# Patient Record
Sex: FEMALE | Race: WHITE | NOT HISPANIC OR LATINO | Employment: FULL TIME | URBAN - METROPOLITAN AREA
[De-identification: names, ages, dates, MRNs, and addresses within clinical notes are randomized per-mention and may not be internally consistent; named-entity substitution may affect disease eponyms.]

---

## 2017-01-17 RX ORDER — NORETHINDRONE ACETATE AND ETHINYL ESTRADIOL 1; .02 MG/1; MG/1
1 TABLET ORAL DAILY
Qty: 63 TABLET | Refills: 2 | Status: SHIPPED | OUTPATIENT
Start: 2017-01-17 | End: 2017-12-15 | Stop reason: ALTCHOICE

## 2017-09-12 NOTE — TELEPHONE ENCOUNTER
Pharmacy sent refill request for Loestrin 1/20. Pt due for annual exam. Rx denied. Pharmacy notified. No refills until appt made.

## 2017-11-07 ENCOUNTER — HOSPITAL ENCOUNTER (EMERGENCY)
Facility: HOSPITAL | Age: 52
Discharge: HOME | End: 2017-11-07
Attending: EMERGENCY MEDICINE | Admitting: EMERGENCY MEDICINE

## 2017-11-07 ENCOUNTER — APPOINTMENT (OUTPATIENT)
Dept: GENERAL RADIOLOGY | Facility: HOSPITAL | Age: 52
End: 2017-11-07

## 2017-11-07 ENCOUNTER — APPOINTMENT (OUTPATIENT)
Dept: ULTRASOUND IMAGING | Facility: HOSPITAL | Age: 52
End: 2017-11-07

## 2017-11-07 VITALS
BODY MASS INDEX: 33.96 KG/M2 | OXYGEN SATURATION: 96 % | TEMPERATURE: 98.4 F | HEART RATE: 80 BPM | DIASTOLIC BLOOD PRESSURE: 94 MMHG | RESPIRATION RATE: 12 BRPM | WEIGHT: 173 LBS | HEIGHT: 60 IN | SYSTOLIC BLOOD PRESSURE: 144 MMHG

## 2017-11-07 DIAGNOSIS — R07.89 CHEST WALL PAIN: ICD-10-CM

## 2017-11-07 DIAGNOSIS — K21.9 GASTROESOPHAGEAL REFLUX DISEASE WITHOUT ESOPHAGITIS: ICD-10-CM

## 2017-11-07 DIAGNOSIS — R07.89 ATYPICAL CHEST PAIN: Primary | ICD-10-CM

## 2017-11-07 LAB
ALBUMIN SERPL-MCNC: 4.6 G/DL (ref 3.2–4.8)
ALBUMIN/GLOB SERPL: 1.8 G/DL (ref 1.5–2.5)
ALP SERPL-CCNC: 70 U/L (ref 25–100)
ALT SERPL W P-5'-P-CCNC: 49 U/L (ref 7–40)
ANION GAP SERPL CALCULATED.3IONS-SCNC: 4 MMOL/L (ref 3–11)
AST SERPL-CCNC: 39 U/L (ref 0–33)
BASOPHILS # BLD AUTO: 0.04 10*3/MM3 (ref 0–0.2)
BASOPHILS NFR BLD AUTO: 0.7 % (ref 0–1)
BILIRUB SERPL-MCNC: 0.5 MG/DL (ref 0.3–1.2)
BNP SERPL-MCNC: 12 PG/ML (ref 0–100)
BUN BLD-MCNC: 16 MG/DL (ref 9–23)
BUN/CREAT SERPL: 22.9 (ref 7–25)
CALCIUM SPEC-SCNC: 9.1 MG/DL (ref 8.7–10.4)
CHLORIDE SERPL-SCNC: 109 MMOL/L (ref 99–109)
CO2 SERPL-SCNC: 25 MMOL/L (ref 20–31)
CREAT BLD-MCNC: 0.7 MG/DL (ref 0.6–1.3)
D DIMER PPP FEU-MCNC: 0.35 MG/L (FEU) (ref 0–0.5)
DEPRECATED RDW RBC AUTO: 43.2 FL (ref 37–54)
EOSINOPHIL # BLD AUTO: 0.1 10*3/MM3 (ref 0–0.3)
EOSINOPHIL NFR BLD AUTO: 1.7 % (ref 0–3)
ERYTHROCYTE [DISTWIDTH] IN BLOOD BY AUTOMATED COUNT: 13 % (ref 11.3–14.5)
GFR SERPL CREATININE-BSD FRML MDRD: 88 ML/MIN/1.73
GLOBULIN UR ELPH-MCNC: 2.5 GM/DL
GLUCOSE BLD-MCNC: 88 MG/DL (ref 70–100)
HCT VFR BLD AUTO: 44.9 % (ref 34.5–44)
HGB BLD-MCNC: 15.1 G/DL (ref 11.5–15.5)
HOLD SPECIMEN: NORMAL
HOLD SPECIMEN: NORMAL
IMM GRANULOCYTES # BLD: 0.02 10*3/MM3 (ref 0–0.03)
IMM GRANULOCYTES NFR BLD: 0.3 % (ref 0–0.6)
LIPASE SERPL-CCNC: 42 U/L (ref 6–51)
LYMPHOCYTES # BLD AUTO: 1.99 10*3/MM3 (ref 0.6–4.8)
LYMPHOCYTES NFR BLD AUTO: 34.3 % (ref 24–44)
MCH RBC QN AUTO: 30.3 PG (ref 27–31)
MCHC RBC AUTO-ENTMCNC: 33.6 G/DL (ref 32–36)
MCV RBC AUTO: 90 FL (ref 80–99)
MONOCYTES # BLD AUTO: 0.58 10*3/MM3 (ref 0–1)
MONOCYTES NFR BLD AUTO: 10 % (ref 0–12)
NEUTROPHILS # BLD AUTO: 3.07 10*3/MM3 (ref 1.5–8.3)
NEUTROPHILS NFR BLD AUTO: 53 % (ref 41–71)
PLATELET # BLD AUTO: 276 10*3/MM3 (ref 150–450)
PMV BLD AUTO: 9.7 FL (ref 6–12)
POTASSIUM BLD-SCNC: 5 MMOL/L (ref 3.5–5.5)
PROT SERPL-MCNC: 7.1 G/DL (ref 5.7–8.2)
RBC # BLD AUTO: 4.99 10*6/MM3 (ref 3.89–5.14)
SODIUM BLD-SCNC: 138 MMOL/L (ref 132–146)
TROPONIN I SERPL-MCNC: 0 NG/ML (ref 0–0.07)
TROPONIN I SERPL-MCNC: 0.01 NG/ML (ref 0–0.07)
WBC NRBC COR # BLD: 5.8 10*3/MM3 (ref 3.5–10.8)
WHOLE BLOOD HOLD SPECIMEN: NORMAL
WHOLE BLOOD HOLD SPECIMEN: NORMAL

## 2017-11-07 PROCEDURE — 93005 ELECTROCARDIOGRAM TRACING: CPT | Performed by: EMERGENCY MEDICINE

## 2017-11-07 PROCEDURE — 83880 ASSAY OF NATRIURETIC PEPTIDE: CPT | Performed by: EMERGENCY MEDICINE

## 2017-11-07 PROCEDURE — 96375 TX/PRO/DX INJ NEW DRUG ADDON: CPT

## 2017-11-07 PROCEDURE — 80053 COMPREHEN METABOLIC PANEL: CPT | Performed by: EMERGENCY MEDICINE

## 2017-11-07 PROCEDURE — 83690 ASSAY OF LIPASE: CPT | Performed by: EMERGENCY MEDICINE

## 2017-11-07 PROCEDURE — 93005 ELECTROCARDIOGRAM TRACING: CPT

## 2017-11-07 PROCEDURE — 85025 COMPLETE CBC W/AUTO DIFF WBC: CPT | Performed by: EMERGENCY MEDICINE

## 2017-11-07 PROCEDURE — 71010 HC CHEST PA OR AP: CPT

## 2017-11-07 PROCEDURE — 25010000002 KETOROLAC TROMETHAMINE PER 15 MG: Performed by: EMERGENCY MEDICINE

## 2017-11-07 PROCEDURE — 99285 EMERGENCY DEPT VISIT HI MDM: CPT

## 2017-11-07 PROCEDURE — 85379 FIBRIN DEGRADATION QUANT: CPT | Performed by: EMERGENCY MEDICINE

## 2017-11-07 PROCEDURE — 76705 ECHO EXAM OF ABDOMEN: CPT

## 2017-11-07 PROCEDURE — 84484 ASSAY OF TROPONIN QUANT: CPT

## 2017-11-07 PROCEDURE — 96374 THER/PROPH/DIAG INJ IV PUSH: CPT

## 2017-11-07 RX ORDER — CYCLOBENZAPRINE HCL 10 MG
10 TABLET ORAL ONCE
Status: COMPLETED | OUTPATIENT
Start: 2017-11-07 | End: 2017-11-07

## 2017-11-07 RX ORDER — NITROGLYCERIN 0.4 MG/1
0.4 TABLET SUBLINGUAL
Qty: 100 TABLET | Refills: 0 | Status: SHIPPED | OUTPATIENT
Start: 2017-11-07 | End: 2021-05-17 | Stop reason: SDUPTHER

## 2017-11-07 RX ORDER — ALUMINA, MAGNESIA, AND SIMETHICONE 2400; 2400; 240 MG/30ML; MG/30ML; MG/30ML
15 SUSPENSION ORAL ONCE
Status: COMPLETED | OUTPATIENT
Start: 2017-11-07 | End: 2017-11-07

## 2017-11-07 RX ORDER — SODIUM CHLORIDE 0.9 % (FLUSH) 0.9 %
10 SYRINGE (ML) INJECTION AS NEEDED
Status: DISCONTINUED | OUTPATIENT
Start: 2017-11-07 | End: 2017-11-07 | Stop reason: HOSPADM

## 2017-11-07 RX ORDER — PANTOPRAZOLE SODIUM 40 MG/1
40 TABLET, DELAYED RELEASE ORAL DAILY
Qty: 30 TABLET | Refills: 0 | Status: SHIPPED | OUTPATIENT
Start: 2017-11-07 | End: 2017-12-15

## 2017-11-07 RX ORDER — FAMOTIDINE 10 MG/ML
20 INJECTION, SOLUTION INTRAVENOUS ONCE
Status: COMPLETED | OUTPATIENT
Start: 2017-11-07 | End: 2017-11-07

## 2017-11-07 RX ORDER — ASPIRIN 81 MG/1
324 TABLET, CHEWABLE ORAL ONCE
Status: DISCONTINUED | OUTPATIENT
Start: 2017-11-07 | End: 2017-11-07

## 2017-11-07 RX ORDER — CYCLOBENZAPRINE HCL 10 MG
10 TABLET ORAL 3 TIMES DAILY PRN
Qty: 20 TABLET | Refills: 0 | Status: SHIPPED | OUTPATIENT
Start: 2017-11-07 | End: 2017-12-15

## 2017-11-07 RX ORDER — KETOROLAC TROMETHAMINE 15 MG/ML
10 INJECTION, SOLUTION INTRAMUSCULAR; INTRAVENOUS ONCE
Status: COMPLETED | OUTPATIENT
Start: 2017-11-07 | End: 2017-11-07

## 2017-11-07 RX ORDER — PANTOPRAZOLE SODIUM 40 MG/1
40 TABLET, DELAYED RELEASE ORAL ONCE
Status: COMPLETED | OUTPATIENT
Start: 2017-11-07 | End: 2017-11-07

## 2017-11-07 RX ADMIN — KETOROLAC TROMETHAMINE 10 MG: 15 INJECTION, SOLUTION INTRAMUSCULAR; INTRAVENOUS at 10:27

## 2017-11-07 RX ADMIN — FAMOTIDINE 20 MG: 10 INJECTION, SOLUTION INTRAVENOUS at 08:55

## 2017-11-07 RX ADMIN — ALUMINUM HYDROXIDE, MAGNESIUM HYDROXIDE, AND DIMETHICONE 15 ML: 400; 400; 40 SUSPENSION ORAL at 10:28

## 2017-11-07 RX ADMIN — CYCLOBENZAPRINE HYDROCHLORIDE 10 MG: 10 TABLET, FILM COATED ORAL at 10:27

## 2017-11-07 RX ADMIN — NITROGLYCERIN 0.5 INCH: 20 OINTMENT TOPICAL at 08:51

## 2017-11-07 RX ADMIN — LIDOCAINE HYDROCHLORIDE 15 ML: 20 SOLUTION ORAL; TOPICAL at 10:28

## 2017-11-07 RX ADMIN — PANTOPRAZOLE SODIUM 40 MG: 40 TABLET, DELAYED RELEASE ORAL at 13:01

## 2017-11-07 NOTE — ED PROVIDER NOTES
Subjective   HPI Comments: Nicki Dillard is a 52 y.o.female who works for [employer] as an . She presents to the emergency department with c/o substernal chest pain radiating to the neck and back that began at 0300 this morning. Her pain progressively worsened throughout the morning but has improved here upon arrival after receiving  and 0.4 mg of nitro en route from EMS. Her pain is described as a stabbing sensation that is worse with deep breathing. During the worst of her discomfort, she felt short of breath and pre-syncopal. She also reports a burning sensation similar to heartburn but denies nausea or diaphoresis. She has experienced episodes of presyncope before, however, she had been without recurrence since August.     Her blood pressure is noted to be elevated today (148/96), but normal in the past. Her bowel movements have been normal. She has no cough, rhinorrhea, fever, or chills but notes a sore throat that improved over the weekend.    The patient had a heart catheterization in 2014 after a similar episode of chest pain triggered by stress. The patient states that she was getting her masters at the time and thought she may have failed her examination. She does note that she is under recent, significant stressors at work and believes that this may be attributing to her current symptoms.     She consumes alcohol occasionally but denies tobacco or illicit drug use. She has no history of DVT or PE. Her family history is positive for HTN. She has no history of cardiac disease that she is aware of.    There are no other acute complaints at this time.     Patient is a 52 y.o. female presenting with chest pain.   History provided by:  Patient  Chest Pain   Pain location:  Substernal area  Pain quality: radiating and stabbing    Pain radiates to:  Neck and upper back  Pain severity:  Moderate  Onset quality:  Gradual  Duration:  4 hours  Timing:  Constant  Progression:  Waxing and  waning  Chronicity:  New  Relieved by:  Nothing  Worsened by:  Nothing  Ineffective treatments:  None tried  Associated symptoms: shortness of breath    Associated symptoms: no abdominal pain, no cough, no dizziness, no dysphagia, no fever, no headache, no nausea, no vomiting and no weakness    Shortness of breath:     Duration:  4 hours    Progression:  Waxing and waning      Review of Systems   Constitutional: Negative for chills and fever.   HENT: Negative for congestion, rhinorrhea, sore throat and trouble swallowing.    Respiratory: Positive for shortness of breath. Negative for cough.    Cardiovascular: Positive for chest pain.   Gastrointestinal: Negative for abdominal pain, diarrhea, nausea and vomiting.   Musculoskeletal: Negative for neck pain.   Neurological: Negative for dizziness, weakness and headaches.        Pre-syncope   All other systems reviewed and are negative.      Past Medical History:   Diagnosis Date   • Menorrhagia        No Known Allergies    Past Surgical History:   Procedure Laterality Date   • ADENOIDECTOMY     •  SECTION     • HAND SURGERY Right    • HERNIA REPAIR     • TONSILLECTOMY     • TUBAL ABDOMINAL LIGATION         History reviewed. No pertinent family history.    Social History     Social History   • Marital status:      Spouse name: N/A   • Number of children: N/A   • Years of education: N/A     Social History Main Topics   • Smoking status: Never Smoker   • Smokeless tobacco: Never Used   • Alcohol use Yes      Comment: occ   • Drug use: No   • Sexual activity: Yes     Partners: Male     Birth control/ protection: OCP     Other Topics Concern   • None     Social History Narrative   • None         Objective   Physical Exam   Constitutional: She is oriented to person, place, and time. She appears well-developed and well-nourished. No distress.   HENT:   Head: Normocephalic and atraumatic.   Nose: Nose normal.   Mouth/Throat: Oropharynx is clear and moist.   Eyes:  Conjunctivae are normal. No scleral icterus.   Neck: Normal range of motion. Neck supple.   Cardiovascular: Normal rate, regular rhythm and normal heart sounds.    No murmur heard.  Pulmonary/Chest: Effort normal and breath sounds normal. No respiratory distress. She has no wheezes. She has no rales.   Abdominal: Soft. Bowel sounds are normal. She exhibits no mass. There is no tenderness. There is no rebound, no guarding and negative Amaya's sign.   Musculoskeletal: Normal range of motion. She exhibits no edema.   No stigmata of DVT.   Neurological: She is alert and oriented to person, place, and time.   Skin: Skin is warm and dry. No erythema.   Psychiatric: She has a normal mood and affect. Her behavior is normal.   Nursing note and vitals reviewed.      Procedures         ED Course  ED Course   Comment By Time   Modified Wells' Criteria -2 Luisito Osorio MD 11/07 0711   Dr. Osorio is at the bedside reevaluating the patient. The patient is resting comfortably. Discussed findings and plan for gallbladder US. All are agreeable. Will reassess after US results. Darshana Arguello 11/07 0912   Patient reports improvement of symptoms.  She again has easily reproducible left anterior chest wall tenderness to palpation or pectoralis muscle that exactly reproduces her presenting symptoms.  She did have this significant GERD symptoms as well.  Her ALT and AST are mildly elevated.  I've ordered a gallbladder ultrasound additionally, but her alkaline phosphatase is normal and she has a negative Amaya sign initially and on repeat evaluation now.  I discussed her normal troponin, and continued evaluation.  Patient and spouse agree. Luisito Osorio MD 11/07 0914   Patient had residual chest pain that was improving prior to her GI cocktail.  She had immediate and complete relief after GI cocktail promptly.  She had the same time had reproducible anterior chest wall tenderness and signs of muscle strain.I discussed the findings  "with the patient.  Second EKG is unremarkable.  She does have borderline cardiomegaly on chest x-ray, and reports that she wakes herself up snoring\".  I've asked her to pursue a home sleep study with Dr. Mendez your primary care physician.I'll plan on treatment for GERD but also chest wall strain.  I discussed stress management with the patient as well.  She reports that she is planning on losing weight and starting an exercise regimen but will have her hold off on that until after her follow-up evaluation although this is very atypical chest pain and she had a heart catheter in 2014 with normal coronary arteries.Very pleasant reliable patient and spouse verbalized agreement with the current plan of care. Luisito Osorio MD 11/07 1052   Heart Score is 1 for age.    Low Score (0-3 points)    Risk of MACE of 0.9-1.7%. Darshana Arguello 11/07 1107   I discussed discharge instructions at length with the patient on last of serial evaluations now immediately prior to discharge.  She feels markedly improved.  She had immediately relief from her GI cocktail.  She has had no recurrence of symptoms.  She did have Toradol and Flexeril as well.  I discussed sleep study in view of snoring and weight loss that she's had along with her hypertension.  She'll discuss this with Dr. Mendez.  I discussed GERD treatment.  She had artery planned on weight loss.  She has seen Dr. Castrejon in the office and have put in a consult for them and discuss chest pain clinic follow-up this week.  We'll keep her off work today and tomorrow.I discussed indications for immediate return.Very pleasant and responsible patient and spouse verbalized understanding agreement with the plan of care. Luisito Osorio MD 11/07 1221     Recent Results (from the past 24 hour(s))   Comprehensive Metabolic Panel    Collection Time: 11/07/17  6:55 AM   Result Value Ref Range    Glucose 88 70 - 100 mg/dL    BUN 16 9 - 23 mg/dL    Creatinine 0.70 0.60 - 1.30 mg/dL    " Sodium 138 132 - 146 mmol/L    Potassium 5.0 3.5 - 5.5 mmol/L    Chloride 109 99 - 109 mmol/L    CO2 25.0 20.0 - 31.0 mmol/L    Calcium 9.1 8.7 - 10.4 mg/dL    Total Protein 7.1 5.7 - 8.2 g/dL    Albumin 4.60 3.20 - 4.80 g/dL    ALT (SGPT) 49 (H) 7 - 40 U/L    AST (SGOT) 39 (H) 0 - 33 U/L    Alkaline Phosphatase 70 25 - 100 U/L    Total Bilirubin 0.5 0.3 - 1.2 mg/dL    eGFR Non African Amer 88 >60 mL/min/1.73    Globulin 2.5 gm/dL    A/G Ratio 1.8 1.5 - 2.5 g/dL    BUN/Creatinine Ratio 22.9 7.0 - 25.0    Anion Gap 4.0 3.0 - 11.0 mmol/L   Lipase    Collection Time: 11/07/17  6:55 AM   Result Value Ref Range    Lipase 42 6 - 51 U/L   BNP    Collection Time: 11/07/17  6:55 AM   Result Value Ref Range    BNP 12.0 0.0 - 100.0 pg/mL   Light Blue Top    Collection Time: 11/07/17  6:55 AM   Result Value Ref Range    Extra Tube hold for add-on    Green Top (Gel)    Collection Time: 11/07/17  6:55 AM   Result Value Ref Range    Extra Tube Hold for add-ons.    Lavender Top    Collection Time: 11/07/17  6:55 AM   Result Value Ref Range    Extra Tube hold for add-on    Gold Top - SST    Collection Time: 11/07/17  6:55 AM   Result Value Ref Range    Extra Tube Hold for add-ons.    CBC Auto Differential    Collection Time: 11/07/17  6:55 AM   Result Value Ref Range    WBC 5.80 3.50 - 10.80 10*3/mm3    RBC 4.99 3.89 - 5.14 10*6/mm3    Hemoglobin 15.1 11.5 - 15.5 g/dL    Hematocrit 44.9 (H) 34.5 - 44.0 %    MCV 90.0 80.0 - 99.0 fL    MCH 30.3 27.0 - 31.0 pg    MCHC 33.6 32.0 - 36.0 g/dL    RDW 13.0 11.3 - 14.5 %    RDW-SD 43.2 37.0 - 54.0 fl    MPV 9.7 6.0 - 12.0 fL    Platelets 276 150 - 450 10*3/mm3    Neutrophil % 53.0 41.0 - 71.0 %    Lymphocyte % 34.3 24.0 - 44.0 %    Monocyte % 10.0 0.0 - 12.0 %    Eosinophil % 1.7 0.0 - 3.0 %    Basophil % 0.7 0.0 - 1.0 %    Immature Grans % 0.3 0.0 - 0.6 %    Neutrophils, Absolute 3.07 1.50 - 8.30 10*3/mm3    Lymphocytes, Absolute 1.99 0.60 - 4.80 10*3/mm3    Monocytes, Absolute 0.58 0.00 -  1.00 10*3/mm3    Eosinophils, Absolute 0.10 0.00 - 0.30 10*3/mm3    Basophils, Absolute 0.04 0.00 - 0.20 10*3/mm3    Immature Grans, Absolute 0.02 0.00 - 0.03 10*3/mm3   D-dimer, Quantitative    Collection Time: 11/07/17  6:55 AM   Result Value Ref Range    D-Dimer, Quantitative 0.35 0.00 - 0.50 mg/L (FEU)   POC Troponin, Rapid    Collection Time: 11/07/17  6:57 AM   Result Value Ref Range    Troponin I 0.00 0.00 - 0.07 ng/mL   POC Troponin, Rapid    Collection Time: 11/07/17 10:34 AM   Result Value Ref Range    Troponin I 0.01 0.00 - 0.07 ng/mL     Note: In addition to lab results from this visit, the labs listed above may include labs taken at another facility or during a different encounter within the last 24 hours. Please correlate lab times with ED admission and discharge times for further clarification of the services performed during this visit.    XR Chest 1 View   Preliminary Result   Borderline cardiac enlargement without acute cardiopulmonary   parenchymal disease.       D:  11/07/2017   E:  11/07/2017              US Gallbladder    (Results Pending)     Vitals:    11/07/17 1100 11/07/17 1115 11/07/17 1130 11/07/17 1145   BP: 120/97 114/74 132/91 136/88   BP Location:       Patient Position:       Pulse: 89 82 75 72   Resp:       Temp:       SpO2: 92% 96% 96% 94%   Weight:       Height:         Medications   sodium chloride 0.9 % flush 10 mL (not administered)   pantoprazole (PROTONIX) EC tablet 40 mg (not administered)   nitroglycerin (NITROSTAT) ointment 0.5 inch (0.5 inches Topical Given 11/7/17 0851)   famotidine (PEPCID) injection 20 mg (20 mg Intravenous Given 11/7/17 0855)   ketorolac (TORADOL) injection 10 mg (10 mg Intravenous Given 11/7/17 1027)   aluminum-magnesium hydroxide-simethicone (MAALOX MAX) 400-400-40 MG/5ML suspension 15 mL (15 mL Oral Given 11/7/17 1028)   lidocaine viscous (XYLOCAINE) 2 % mouth solution 15 mL (15 mL Mouth/Throat Given 11/7/17 1024)   cyclobenzaprine (FLEXERIL)  tablet 10 mg (10 mg Oral Given 11/7/17 1027)     ECG/EMG Results (last 24 hours)     Procedure Component Value Units Date/Time    ECG 12 Lead [[id number]] Collected:  11/07/17 0649     Updated:  11/07/17 0651                        [facility]    Final diagnoses:   Atypical chest pain   Chest wall pain   Gastroesophageal reflux disease without esophagitis       Documentation assistance provided by liliana Arguello.  Information recorded by the liliana was done at my direction and has been verified and validated by me.     Darshana Arguello  11/07/17 0753       Darshana Arguello  11/07/17 0812       Darshana Arguello  11/07/17 1102       Luisito Osorio MD  11/07/17 1223

## 2017-11-07 NOTE — DISCHARGE INSTRUCTIONS
No vigorous physical activity until you are cleared by your cardiologist.     Follow-up with the cardiology clinic.  They'll call you within the next 24 hours with an appointment this week.  Call them if they do not call by noon tomorrow.    Schedule an appointment with Dr. Mendez this week for follow up to discuss a weight loss program, GERD, chest wall strain, and a sleep study.  Have Dr. Mendez set up an at-home night sleep study for you this coming week.  Talked to Dr. Mendze about stress relief as well    Take the Protonix daily.  He was given been given one dose today.  You may take Pepcid at night for the next 3 days over-the-counter until the Protonix is fully effective.    Take the Flexeril as needed for muscle spasm but I like you to take 2 more doses today as ordered.  No driving or dangerous activity while taking the Flexeril    Immediately return to the emergency department if you develop new or worsening symptoms.  If you have any significant cardiac symptoms take your nitroglycerin return to the emergency department    Continue your other medications as previously prescribed.

## 2017-12-15 ENCOUNTER — OFFICE VISIT (OUTPATIENT)
Dept: OBSTETRICS AND GYNECOLOGY | Facility: CLINIC | Age: 52
End: 2017-12-15

## 2017-12-15 VITALS
BODY MASS INDEX: 29.83 KG/M2 | DIASTOLIC BLOOD PRESSURE: 84 MMHG | WEIGHT: 158 LBS | OXYGEN SATURATION: 98 % | SYSTOLIC BLOOD PRESSURE: 128 MMHG | HEART RATE: 84 BPM | HEIGHT: 61 IN | RESPIRATION RATE: 20 BRPM

## 2017-12-15 DIAGNOSIS — Z01.419 WELL WOMAN EXAM WITH ROUTINE GYNECOLOGICAL EXAM: Primary | ICD-10-CM

## 2017-12-15 DIAGNOSIS — N95.1 PERIMENOPAUSE: ICD-10-CM

## 2017-12-15 PROCEDURE — 99396 PREV VISIT EST AGE 40-64: CPT | Performed by: NURSE PRACTITIONER

## 2017-12-15 NOTE — PROGRESS NOTES
[facility] ANNUAL WELL [facility]OMAN VISIT      Nicki Dillard  [id number]  1965      Chief Complaint: Gynecologic Exam (Annual Exam)        History of present illness:    Nicki Dillard is a 52 y.o. year old  presenting to be seen for her annual exam. She is a previous patient of Dr. Luna, last seen on 2016 for annual exam. Last year she was having irregular heavy menses and started on OCPs. She did well, but then PCP discovered elevated BP and encouraged her to d/c these. LMP was in 2017 and has not recurred. She describes occasional host flashes, but nothing that interferes with her daily life. Manmmogram is UTD and she would like to repeat pap smear today. PCP is managing her colon screening and they are planning cologard, declines referral for colonoscopy today.     SEXUAL Hx:  She is currently sexually active.  In the past year there has not been new sexual partners.    Condoms are not typically used.  She would not like to be screened for STD's at today's exam.  Current birth control method: tubal ligation.  She is happy with her current method of contraception and does not want to discuss alternative methods of contraception.  MENSTRUAL Hx:  Patient's last menstrual period was 2017. No significant menopausal symptoms.   HEALTH Hx:  She exercises regularly: no (and has no plans to become more active).  She wears her seat belt:yes.  She has concerns about domestic violence: no.  She is a non-smoker.      Past Medical History:   Diagnosis Date   • Arthritis    • Hypertension    • Menorrhagia    • Urinary leakage        Past Surgical History:   Procedure Laterality Date   • ADENOIDECTOMY     •  SECTION     • HAND SURGERY Right    • HERNIA REPAIR     • TONSILLECTOMY     • TUBAL ABDOMINAL LIGATION         MEDICATIONS: The current medication list was reviewed and reconciled.     Allergies:  has No Known Allergies.    Family History   Problem Relation Age of Onset   •  "Hypertension Father        Health Maintenance:  Last mammogram was 9/2017. Last pap smear was unknown, results were  normal PAP.. She  does not have a history of abnormal pap smears.    Review of Systems   Constitutional: Negative for fatigue, fever and unexpected weight change.   HENT: Negative for congestion, ear pain, hearing loss, sinus pressure and trouble swallowing.    Eyes: Negative for visual disturbance.   Respiratory: Negative for cough, chest tightness, shortness of breath and wheezing.    Cardiovascular: Negative for chest pain, palpitations and leg swelling.   Gastrointestinal: Negative for abdominal distention, abdominal pain, constipation, diarrhea, nausea and vomiting.   Endocrine: Negative for cold intolerance, heat intolerance, polydipsia, polyphagia and polyuria.   Genitourinary: Negative for difficulty urinating, dyspareunia, dysuria, frequency, hematuria, pelvic pain, urgency, vaginal bleeding, vaginal discharge and vaginal pain.   Musculoskeletal: Negative for arthralgias, gait problem, joint swelling and myalgias.   Skin: Negative for color change, pallor and rash.   Neurological: Negative for dizziness, seizures, syncope, weakness, light-headedness, numbness and headaches.   Hematological: Negative for adenopathy. Does not bruise/bleed easily.   Psychiatric/Behavioral: Negative for agitation, confusion, sleep disturbance and suicidal ideas. The patient is not nervous/anxious.        Physical Exam  Vital Signs: /84  Pulse 84  Resp 20  Ht 155 cm (61.02\")  Wt 71.7 kg (158 lb)  LMP 07/01/2017  SpO2 98%  BMI 29.83 kg/m2   General Appearance:  alert, cooperative, no apparent distress and appears stated age   Neurologic/Psychiatric: A&O x 3, gait steady, appropriate affect   HEENT:  Normocephalic, without obvious abnormality, mucous membranes moist   Neck: Supple, symmetrical, trachea midline, no adenopathy;  No thyromegaly, masses, or tenderness   Back:   Symmetric, no curvature, ROM " normal, no CVA tenderness   Lungs:   Clear to auscultation bilaterally; respirations regular, even, and unlabored bilaterally   Heart:  Regular rate and rhythm, no murmurs appreciated   Breasts:  Symmetrical, no masses, no lesions and no nipple discharge   Abdomen:   Soft, non-tender, non-distended and no organomegaly   Lymph nodes: No cervical, supraclavicular, inguinal or axillary adenopathy noted   Extremities: Normal, atraumatic; no clubbing, cyanosis, or edema    Skin: No rashes, ulcers, or suspicious lesions noted   Pelvic: External Genitalia  without lesions or skin changes  Vagina  is pink, moist, without lesions.   Cervix  normal, without lesions, no discharge, no CMT and pap obtained  Uterus  normal size, midposition, mobile and nontender  Ovaries  without palpable masses or fullness  Parametria  smooth  Rectovaginal  Female rectovaginal: deferred       Procedure Note:  No notes on file    Assessment and Plan:    Nicki was seen today for gynecologic exam.    Diagnoses and all orders for this visit:    Well woman exam with routine gynecological exam    Perimenopause        Pap was done today.  If she does not receive the results of the Pap within 2 weeks  time, she was instructed to call to find out the results.  I explained to Nicki that the recommendations for Pap smear interval in a low risk patient's has lengthened to 3 years time.  I encouraged her to be seen yearly for a full physical exam including breast and pelvic exam even during the off years when PAP's will not be performed.    She was encouraged to get yearly mammograms.  She should report any palpable breast lump(s) or skin changes regardless of mammographic findings.  I explained to Nicki that notification regarding her mammogram results will come from the center performing the study.  Our office will not be routinely calling with mammogram results.  It is her responsibility to make sure that the results from the mammogram are  communicated to her by the breast center.  If she has any questions about the results, she is welcome to call our office anytime.    Colon cancer screening managed by PCP. She was recommended to begin bone density scans (DEXA) at age 60-65 for osteoporosis screening.    We discussed perimenopausal state. Postmenopausal when 12 months without a menstrual bleed. No need for HRT as vasomotor symptoms are currently very tolerable. All questions answered to her satisfaction.       Return in about 1 year (around 12/15/2018) for annual exam or PRN.      Tara Powell, ESTRADA      Note: Speech recognition transcription software was used to dictate portions of this document.  An attempt at proofreading has been made though minor errors in transcription may still be present.  Please do not hesitate to call our office with any questions.

## 2018-05-15 ENCOUNTER — OFFICE VISIT (OUTPATIENT)
Dept: RETAIL CLINIC | Facility: CLINIC | Age: 53
End: 2018-05-15

## 2018-05-15 VITALS
DIASTOLIC BLOOD PRESSURE: 100 MMHG | HEIGHT: 60 IN | SYSTOLIC BLOOD PRESSURE: 160 MMHG | WEIGHT: 163 LBS | TEMPERATURE: 98.9 F | BODY MASS INDEX: 32 KG/M2 | HEART RATE: 96 BPM | OXYGEN SATURATION: 98 %

## 2018-05-15 DIAGNOSIS — R03.0 ELEVATED BLOOD PRESSURE READING: ICD-10-CM

## 2018-05-15 DIAGNOSIS — J01.10 ACUTE NON-RECURRENT FRONTAL SINUSITIS: Primary | ICD-10-CM

## 2018-05-15 DIAGNOSIS — Z12.11 SCREEN FOR COLON CANCER: ICD-10-CM

## 2018-05-15 PROCEDURE — 99213 OFFICE O/P EST LOW 20 MIN: CPT | Performed by: NURSE PRACTITIONER

## 2018-05-15 RX ORDER — AMOXICILLIN AND CLAVULANATE POTASSIUM 875; 125 MG/1; MG/1
1 TABLET, FILM COATED ORAL 2 TIMES DAILY
Qty: 20 TABLET | Refills: 0 | Status: SHIPPED | OUTPATIENT
Start: 2018-05-15 | End: 2018-05-25 | Stop reason: HOSPADM

## 2018-05-15 NOTE — PATIENT INSTRUCTIONS
Sinusitis, Adult  Sinusitis is soreness and inflammation of your sinuses. Sinuses are hollow spaces in the bones around your face. They are located:  · Around your eyes.  · In the middle of your forehead.  · Behind your nose.  · In your cheekbones.  Your sinuses and nasal passages are lined with a stringy fluid (mucus). Mucus normally drains out of your sinuses. When your nasal tissues get inflamed or swollen, the mucus can get trapped or blocked so air cannot flow through your sinuses. This lets bacteria, viruses, and funguses grow, and that leads to infection.  Follow these instructions at home:  Medicines   · Take, use, or apply over-the-counter and prescription medicines only as told by your doctor. These may include nasal sprays.  · If you were prescribed an antibiotic medicine, take it as told by your doctor. Do not stop taking the antibiotic even if you start to feel better.  Hydrate and Humidify   · Drink enough water to keep your pee (urine) clear or pale yellow.  · Use a cool mist humidifier to keep the humidity level in your home above 50%.  · Breathe in steam for 10-15 minutes, 3-4 times a day or as told by your doctor. You can do this in the bathroom while a hot shower is running.  · Try not to spend time in cool or dry air.  Rest   · Rest as much as possible.  · Sleep with your head raised (elevated).  · Make sure to get enough sleep each night.  General instructions   · Put a warm, moist washcloth on your face 3-4 times a day or as told by your doctor. This will help with discomfort.  · Wash your hands often with soap and water. If there is no soap and water, use hand .  · Do not smoke. Avoid being around people who are smoking (secondhand smoke).  · Keep all follow-up visits as told by your doctor. This is important.  Contact a doctor if:  · You have a fever.  · Your symptoms get worse.  · Your symptoms do not get better within 10 days.  Get help right away if:  · You have a very bad  headache.  · You cannot stop throwing up (vomiting).  · You have pain or swelling around your face or eyes.  · You have trouble seeing.  · You feel confused.  · Your neck is stiff.  · You have trouble breathing.  This information is not intended to replace advice given to you by your health care provider. Make sure you discuss any questions you have with your health care provider.  Document Released: 06/05/2009 Document Revised: 08/13/2017 Document Reviewed: 10/12/2016  [employer] Interactive Patient Education © 2017 [employer]    Hypertension  Hypertension is another name for high blood pressure. High blood pressure forces your heart to work harder to pump blood. This can cause problems over time.  There are two numbers in a blood pressure reading. There is a top number (systolic) over a bottom number (diastolic). It is best to have a blood pressure below 120/80. Healthy choices can help lower your blood pressure. You may need medicine to help lower your blood pressure if:  · Your blood pressure cannot be lowered with healthy choices.  · Your blood pressure is higher than 130/80.  Follow these instructions at home:  Eating and drinking   · If directed, follow the DASH eating plan. This diet includes:  ¨ Filling half of your plate at each meal with fruits and vegetables.  ¨ Filling one quarter of your plate at each meal with whole grains. Whole grains include whole wheat pasta, brown rice, and whole grain bread.  ¨ Eating or drinking low-fat dairy products, such as skim milk or low-fat yogurt.  ¨ Filling one quarter of your plate at each meal with low-fat (lean) proteins. Low-fat proteins include fish, skinless chicken, eggs, beans, and tofu.  ¨ Avoiding fatty meat, cured and processed meat, or chicken with skin.  ¨ Avoiding premade or processed food.  · Eat less than 1,500 mg of salt (sodium) a day.  · Limit alcohol use to no more than 1 drink a day for nonpregnant women and 2 drinks a day for men. One drink  equals 12 oz of beer, 5 oz of wine, or 1½ oz of hard liquor.  Lifestyle   · Work with your doctor to stay at a healthy weight or to lose weight. Ask your doctor what the best weight is for you.  · Get at least 30 minutes of exercise that causes your heart to beat faster (aerobic exercise) most days of the week. This may include walking, swimming, or biking.  · Get at least 30 minutes of exercise that strengthens your muscles (resistance exercise) at least 3 days a week. This may include lifting weights or pilates.  · Do not use any products that contain nicotine or tobacco. This includes cigarettes and e-cigarettes. If you need help quitting, ask your doctor.  · Check your blood pressure at home as told by your doctor.  · Keep all follow-up visits as told by your doctor. This is important.  Medicines   · Take over-the-counter and prescription medicines only as told by your doctor. Follow directions carefully.  · Do not skip doses of blood pressure medicine. The medicine does not work as well if you skip doses. Skipping doses also puts you at risk for problems.  · Ask your doctor about side effects or reactions to medicines that you should watch for.  Contact a doctor if:  · You think you are having a reaction to the medicine you are taking.  · You have headaches that keep coming back (recurring).  · You feel dizzy.  · You have swelling in your ankles.  · You have trouble with your vision.  Get help right away if:  · You get a very bad headache.  · You start to feel confused.  · You feel weak or numb.  · You feel faint.  · You get very bad pain in your:  ¨ Chest.  ¨ Belly (abdomen).  · You throw up (vomit) more than once.  · You have trouble breathing.  Summary  · Hypertension is another name for high blood pressure.  · Making healthy choices can help lower blood pressure. If your blood pressure cannot be controlled with healthy choices, you may need to take medicine.  This information is not intended to replace  advice given to you by your health care provider. Make sure you discuss any questions you have with your health care provider.  Document Released: 06/05/2009 Document Revised: 11/15/2017 Document Reviewed: 11/15/2017  [employer]vier Interactive Patient Education © 2017 [employer].

## 2018-05-15 NOTE — PROGRESS NOTES
Subjective   Nicki Dillard is a 53 y.o. female.     Sore Throat    This is a new (headache and bilateral sinus pressure. post nasal draiange, sore throat and yellow expectorant) problem. The current episode started in the past 7 days. The problem has been gradually worsening. Neither side of throat is experiencing more pain than the other. The maximum temperature recorded prior to her arrival was 100.4 - 100.9 F. The fever has been present for 1 to 2 days. The pain is at a severity of 8/10. The pain is moderate. Associated symptoms include headaches. Pertinent negatives include no abdominal pain, congestion, coughing, diarrhea, drooling, ear discharge, ear pain, hoarse voice, plugged ear sensation, neck pain, shortness of breath, stridor, swollen glands, trouble swallowing or vomiting. She has had no exposure to strep or mono. She has tried acetaminophen (vicks cold and sinus medication) for the symptoms. The treatment provided mild relief.        The following portions of the patient's history were reviewed and updated as appropriate: allergies, current medications, past family history, past medical history, past surgical history and problem list.    Review of Systems   Constitutional: Positive for activity change, appetite change, fatigue and fever.   HENT: Positive for postnasal drip, sinus pain, sinus pressure and sore throat. Negative for congestion, drooling, ear discharge, ear pain, hoarse voice and trouble swallowing.    Eyes: Positive for pain.        Pain behind eyes in sinus area bilaterally   Respiratory: Negative for cough, shortness of breath and stridor.    Cardiovascular: Negative.    Gastrointestinal: Negative.  Negative for abdominal pain, diarrhea and vomiting.   Musculoskeletal: Negative.  Negative for neck pain.   Skin: Negative.    Allergic/Immunologic: Positive for environmental allergies.   Neurological: Positive for light-headedness and headaches.   Hematological: Negative.         Objective   Physical Exam   Constitutional: She is oriented to person, place, and time. She appears well-developed and well-nourished. She is cooperative.   HENT:   Head: Normocephalic and atraumatic.   Right Ear: Tympanic membrane and external ear normal.   Left Ear: Tympanic membrane, external ear and ear canal normal.   Nose: Mucosal edema and rhinorrhea present. No sinus tenderness. Right sinus exhibits maxillary sinus tenderness and frontal sinus tenderness. Left sinus exhibits maxillary sinus tenderness and frontal sinus tenderness.   Mouth/Throat: Mucous membranes are normal. Posterior oropharyngeal erythema present. No oropharyngeal exudate or tonsillar abscesses. Tonsils are 0 on the right. Tonsils are 0 on the left.   Eyes: Conjunctivae are normal.   Neck: Normal range of motion. Neck supple.   Cardiovascular: Normal rate, regular rhythm and normal heart sounds.  Exam reveals no friction rub.    No murmur heard.  Pulmonary/Chest: Effort normal and breath sounds normal. No respiratory distress. She has no wheezes. She has no rales.   Neurological: She is alert and oriented to person, place, and time.   Skin: Skin is warm and dry.   Psychiatric: She has a normal mood and affect.   Vitals reviewed.      Assessment/Plan   Nicki was seen today for sore throat and headache.    Diagnoses and all orders for this visit:    Acute non-recurrent frontal sinusitis  -     amoxicillin-clavulanate (AUGMENTIN) 875-125 MG per tablet; Take 1 tablet by mouth 2 (Two) Times a Day.    Elevated blood pressure reading    check b/p daily.   Stop Vicks sinus and cold  Use coricidin HBP for congestion  Increase fluids and rest

## 2018-05-24 ENCOUNTER — APPOINTMENT (OUTPATIENT)
Dept: CARDIOLOGY | Facility: HOSPITAL | Age: 53
End: 2018-05-24
Attending: INTERNAL MEDICINE

## 2018-05-24 ENCOUNTER — HOSPITAL ENCOUNTER (INPATIENT)
Facility: HOSPITAL | Age: 53
LOS: 1 days | Discharge: HOME | End: 2018-05-25
Attending: EMERGENCY MEDICINE | Admitting: INTERNAL MEDICINE

## 2018-05-24 ENCOUNTER — APPOINTMENT (OUTPATIENT)
Dept: GENERAL RADIOLOGY | Facility: HOSPITAL | Age: 53
End: 2018-05-24

## 2018-05-24 DIAGNOSIS — R07.9 CHEST PAIN, UNSPECIFIED TYPE: Primary | ICD-10-CM

## 2018-05-24 DIAGNOSIS — R03.0 ELEVATED BLOOD PRESSURE READING WITHOUT DIAGNOSIS OF HYPERTENSION: ICD-10-CM

## 2018-05-24 DIAGNOSIS — I21.4 NON-STEMI (NON-ST ELEVATED MYOCARDIAL INFARCTION) (HCC): ICD-10-CM

## 2018-05-24 LAB
ALBUMIN SERPL-MCNC: 4.4 G/DL (ref 3.2–4.8)
ALBUMIN/GLOB SERPL: 1.4 G/DL (ref 1.5–2.5)
ALP SERPL-CCNC: 81 U/L (ref 25–100)
ALT SERPL W P-5'-P-CCNC: 92 U/L (ref 7–40)
ANION GAP SERPL CALCULATED.3IONS-SCNC: 10 MMOL/L (ref 3–11)
AST SERPL-CCNC: 68 U/L (ref 0–33)
B-HCG UR QL: NEGATIVE
BASOPHILS # BLD AUTO: 0.02 10*3/MM3 (ref 0–0.2)
BASOPHILS NFR BLD AUTO: 0.3 % (ref 0–1)
BILIRUB SERPL-MCNC: 0.5 MG/DL (ref 0.3–1.2)
BNP SERPL-MCNC: 66 PG/ML (ref 0–100)
BUN BLD-MCNC: 8 MG/DL (ref 9–23)
BUN/CREAT SERPL: 13.3 (ref 7–25)
CALCIUM SPEC-SCNC: 9.1 MG/DL (ref 8.7–10.4)
CHLORIDE SERPL-SCNC: 105 MMOL/L (ref 99–109)
CO2 SERPL-SCNC: 25 MMOL/L (ref 20–31)
CREAT BLD-MCNC: 0.6 MG/DL (ref 0.6–1.3)
DEPRECATED RDW RBC AUTO: 41.6 FL (ref 37–54)
EOSINOPHIL # BLD AUTO: 0.07 10*3/MM3 (ref 0–0.3)
EOSINOPHIL NFR BLD AUTO: 1.1 % (ref 0–3)
ERYTHROCYTE [DISTWIDTH] IN BLOOD BY AUTOMATED COUNT: 12.8 % (ref 11.3–14.5)
GFR SERPL CREATININE-BSD FRML MDRD: 105 ML/MIN/1.73
GLOBULIN UR ELPH-MCNC: 3.1 GM/DL
GLUCOSE BLD-MCNC: 101 MG/DL (ref 70–100)
HCT VFR BLD AUTO: 44.4 % (ref 34.5–44)
HGB BLD-MCNC: 14.5 G/DL (ref 11.5–15.5)
HOLD SPECIMEN: NORMAL
HOLD SPECIMEN: NORMAL
IMM GRANULOCYTES # BLD: 0.03 10*3/MM3 (ref 0–0.03)
IMM GRANULOCYTES NFR BLD: 0.5 % (ref 0–0.6)
INTERNAL NEGATIVE CONTROL: NEGATIVE
INTERNAL POSITIVE CONTROL: POSITIVE
LIPASE SERPL-CCNC: 38 U/L (ref 6–51)
LYMPHOCYTES # BLD AUTO: 1.94 10*3/MM3 (ref 0.6–4.8)
LYMPHOCYTES NFR BLD AUTO: 29.2 % (ref 24–44)
Lab: NORMAL
MCH RBC QN AUTO: 29 PG (ref 27–31)
MCHC RBC AUTO-ENTMCNC: 32.7 G/DL (ref 32–36)
MCV RBC AUTO: 88.8 FL (ref 80–99)
MONOCYTES # BLD AUTO: 0.5 10*3/MM3 (ref 0–1)
MONOCYTES NFR BLD AUTO: 7.5 % (ref 0–12)
NEUTROPHILS # BLD AUTO: 4.09 10*3/MM3 (ref 1.5–8.3)
NEUTROPHILS NFR BLD AUTO: 61.4 % (ref 41–71)
PLATELET # BLD AUTO: 251 10*3/MM3 (ref 150–450)
PMV BLD AUTO: 9.5 FL (ref 6–12)
POTASSIUM BLD-SCNC: 3.9 MMOL/L (ref 3.5–5.5)
PROT SERPL-MCNC: 7.5 G/DL (ref 5.7–8.2)
RBC # BLD AUTO: 5 10*6/MM3 (ref 3.89–5.14)
SODIUM BLD-SCNC: 140 MMOL/L (ref 132–146)
TROPONIN I SERPL-MCNC: 0.14 NG/ML (ref 0–0.07)
TROPONIN I SERPL-MCNC: 2.85 NG/ML (ref 0–0.07)
TROPONIN I SERPL-MCNC: 5.72 NG/ML
WBC NRBC COR # BLD: 6.65 10*3/MM3 (ref 3.5–10.8)
WHOLE BLOOD HOLD SPECIMEN: NORMAL
WHOLE BLOOD HOLD SPECIMEN: NORMAL

## 2018-05-24 PROCEDURE — 93306 TTE W/DOPPLER COMPLETE: CPT

## 2018-05-24 PROCEDURE — 83880 ASSAY OF NATRIURETIC PEPTIDE: CPT

## 2018-05-24 PROCEDURE — 84484 ASSAY OF TROPONIN QUANT: CPT

## 2018-05-24 PROCEDURE — 93010 ELECTROCARDIOGRAM REPORT: CPT | Performed by: INTERNAL MEDICINE

## 2018-05-24 PROCEDURE — 93306 TTE W/DOPPLER COMPLETE: CPT | Performed by: INTERNAL MEDICINE

## 2018-05-24 PROCEDURE — 99285 EMERGENCY DEPT VISIT HI MDM: CPT

## 2018-05-24 PROCEDURE — 93005 ELECTROCARDIOGRAM TRACING: CPT | Performed by: EMERGENCY MEDICINE

## 2018-05-24 PROCEDURE — 85025 COMPLETE CBC W/AUTO DIFF WBC: CPT

## 2018-05-24 PROCEDURE — 83690 ASSAY OF LIPASE: CPT

## 2018-05-24 PROCEDURE — 93005 ELECTROCARDIOGRAM TRACING: CPT

## 2018-05-24 PROCEDURE — 80053 COMPREHEN METABOLIC PANEL: CPT

## 2018-05-24 PROCEDURE — 25010000002 SULFUR HEXAFLUORIDE MICROSPH 60.7-25 MG RECONSTITUTED SUSPENSION: Performed by: EMERGENCY MEDICINE

## 2018-05-24 PROCEDURE — 99223 1ST HOSP IP/OBS HIGH 75: CPT | Performed by: INTERNAL MEDICINE

## 2018-05-24 PROCEDURE — 71045 X-RAY EXAM CHEST 1 VIEW: CPT

## 2018-05-24 PROCEDURE — 93005 ELECTROCARDIOGRAM TRACING: CPT | Performed by: NURSE PRACTITIONER

## 2018-05-24 PROCEDURE — 25010000002 ENOXAPARIN PER 10 MG: Performed by: INTERNAL MEDICINE

## 2018-05-24 PROCEDURE — 84484 ASSAY OF TROPONIN QUANT: CPT | Performed by: NURSE PRACTITIONER

## 2018-05-24 RX ORDER — LABETALOL HYDROCHLORIDE 5 MG/ML
10 INJECTION, SOLUTION INTRAVENOUS ONCE
Status: COMPLETED | OUTPATIENT
Start: 2018-05-24 | End: 2018-05-24

## 2018-05-24 RX ORDER — AMOXICILLIN AND CLAVULANATE POTASSIUM 875; 125 MG/1; MG/1
1 TABLET, FILM COATED ORAL 2 TIMES DAILY
Status: DISCONTINUED | OUTPATIENT
Start: 2018-05-24 | End: 2018-05-25 | Stop reason: HOSPADM

## 2018-05-24 RX ORDER — ALUMINA, MAGNESIA, AND SIMETHICONE 2400; 2400; 240 MG/30ML; MG/30ML; MG/30ML
15 SUSPENSION ORAL ONCE
Status: COMPLETED | OUTPATIENT
Start: 2018-05-24 | End: 2018-05-24

## 2018-05-24 RX ORDER — DIPHENOXYLATE HYDROCHLORIDE AND ATROPINE SULFATE 2.5; .025 MG/1; MG/1
1 TABLET ORAL DAILY
Status: DISCONTINUED | OUTPATIENT
Start: 2018-05-25 | End: 2018-05-25 | Stop reason: HOSPADM

## 2018-05-24 RX ORDER — ASPIRIN 81 MG/1
81 TABLET ORAL DAILY
Status: DISCONTINUED | OUTPATIENT
Start: 2018-05-24 | End: 2018-05-25 | Stop reason: HOSPADM

## 2018-05-24 RX ORDER — SODIUM CHLORIDE 0.9 % (FLUSH) 0.9 %
10 SYRINGE (ML) INJECTION AS NEEDED
Status: DISCONTINUED | OUTPATIENT
Start: 2018-05-24 | End: 2018-05-25 | Stop reason: HOSPADM

## 2018-05-24 RX ORDER — SODIUM CHLORIDE 0.9 % (FLUSH) 0.9 %
1-10 SYRINGE (ML) INJECTION AS NEEDED
Status: DISCONTINUED | OUTPATIENT
Start: 2018-05-24 | End: 2018-05-25 | Stop reason: HOSPADM

## 2018-05-24 RX ADMIN — AMOXICILLIN AND CLAVULANATE POTASSIUM 1 TABLET: 875; 125 TABLET, FILM COATED ORAL at 19:21

## 2018-05-24 RX ADMIN — ENOXAPARIN SODIUM 80 MG: 80 INJECTION SUBCUTANEOUS at 19:20

## 2018-05-24 RX ADMIN — SULFUR HEXAFLUORIDE 3 ML: KIT at 16:27

## 2018-05-24 RX ADMIN — METOPROLOL TARTRATE 12.5 MG: 25 TABLET ORAL at 17:34

## 2018-05-24 RX ADMIN — SACUBITRIL AND VALSARTAN 1 TABLET: 24; 26 TABLET, FILM COATED ORAL at 22:11

## 2018-05-24 RX ADMIN — ALUMINUM HYDROXIDE, MAGNESIUM HYDROXIDE, AND DIMETHICONE 15 ML: 400; 400; 40 SUSPENSION ORAL at 14:50

## 2018-05-24 RX ADMIN — LABETALOL HYDROCHLORIDE 10 MG: 5 INJECTION INTRAVENOUS at 14:51

## 2018-05-24 RX ADMIN — ASPIRIN 81 MG: 81 TABLET, COATED ORAL at 17:33

## 2018-05-24 RX ADMIN — LIDOCAINE HYDROCHLORIDE 15 ML: 20 SOLUTION ORAL; TOPICAL at 14:50

## 2018-05-24 NOTE — ED PROVIDER NOTES
Subjective   Ms. Nicki Dillard is a 53 y.o. female who presents to the ED by EMS with c/o chest pain onset approximately 1030. She reports at approximately 1030 she was upset at work and had sudden onset of substernal chest pain with accompanied headache. At initial onset she took 1 dose of nitroglycerin, which did not provide relief, and en route EMS gave another dose of nitroglycerin. In ED now she is beginning to feel improvement to chest pain, however it has not resolved completley. Additionally, she reports she is recovering from a URI with sx of sinus congestion and sinus pressure, for which she is taking Augmentin and MucinexD (last dose this morning). She denies SoA, nausea, vomiting, and any other acute sx at this time. Ms. Dillard has hx of Menorrhagia, HTN, arthritis.               History provided by:  Patient  Chest Pain   Pain location:  Substernal area  Pain radiates to:  Does not radiate  Pain severity:  Moderate  Onset quality:  Sudden  Timing:  Constant  Progression:  Improving  Chronicity:  New  Context: stress    Relieved by:  Nitroglycerin  Worsened by:  Nothing  Ineffective treatments:  None tried  Associated symptoms: headache    Associated symptoms: no nausea, no shortness of breath and no vomiting    Risk factors: hypertension    Risk factors: not male        Review of Systems   HENT: Positive for congestion and sinus pressure.    Respiratory: Negative for shortness of breath.    Cardiovascular: Positive for chest pain.   Gastrointestinal: Negative for nausea and vomiting.   Neurological: Positive for headaches.   All other systems reviewed and are negative.      Past Medical History:   Diagnosis Date   • Arthritis    • Hypertension    • Menorrhagia    • Urinary leakage        No Known Allergies    Past Surgical History:   Procedure Laterality Date   • ADENOIDECTOMY     •  SECTION     • HAND SURGERY Right    • HERNIA REPAIR     • TONSILLECTOMY     • TUBAL ABDOMINAL LIGATION          Family History   Problem Relation Age of Onset   • Hypertension Father        Social History     Social History   • Marital status:      Social History Main Topics   • Smoking status: Never Smoker   • Smokeless tobacco: Never Used   • Alcohol use Yes      Comment: occ   • Drug use: No   • Sexual activity: Yes     Partners: Male     Other Topics Concern   • Not on file         Objective   Physical Exam   Constitutional: She is oriented to person, place, and time. She appears well-developed and well-nourished. No distress.   HENT:   Head: Normocephalic and atraumatic.   Nose: Nose normal.   Mouth/Throat: Oropharynx is clear and moist.   Eyes: Conjunctivae are normal. No scleral icterus.   Neck: Normal range of motion. Neck supple.   Cardiovascular: Normal rate and regular rhythm.  Exam reveals no gallop and no friction rub.    No murmur heard.  Heart sounds are bounding.    Pulmonary/Chest: Effort normal and breath sounds normal. No respiratory distress. She has no wheezes. She has no rales. She exhibits no tenderness.   Lungs clear to auscultation   Abdominal: Soft. Bowel sounds are normal.   Musculoskeletal: Normal range of motion.   Neurological: She is alert and oriented to person, place, and time.   Skin: Skin is warm and dry.   Psychiatric: She has a normal mood and affect. Her behavior is normal. Judgment and thought content normal.   Nursing note and vitals reviewed.      Procedures         ED Course  ED Course as of May 25 2315   Thu May 24, 2018   1343 She has had relief in the past with GI cocktail and will give that we will give labetalol as well. Trop is 0.14, paged Dr Briscoe who is on call for Dr Castrejon.  [DT]   1440 I spoke with Hope in the cardiology office and requested a consult  [DT]   1643 Mrs. Dillard denies further chest pain.  Her second troponin is 2.9.  Echo tech is at bedside doing echocardiogram.  I discussed with Dr. Valenzuela and he is going to admit her to the hospital.  [DT]       ED Course User Index  [DT] Kwesi Nathan MD                     MDM  Number of Diagnoses or Management Options  Elevated blood pressure reading without diagnosis of hypertension: new and does not require workup  Non-STEMI (non-ST elevated myocardial infarction): new and requires workup     Amount and/or Complexity of Data Reviewed  Clinical lab tests: ordered and reviewed  Tests in the radiology section of CPT®: ordered and reviewed  Review and summarize past medical records: yes  Discuss the patient with other providers: yes  Independent visualization of images, tracings, or specimens: yes    Patient Progress  Patient progress: improved      Final diagnoses:   Non-STEMI (non-ST elevated myocardial infarction)   Elevated blood pressure reading without diagnosis of hypertension       Documentation assistance provided by liliana Rojas.  Information recorded by the scribe was done at my direction and has been verified and validated by me.     Stevie Rojas  05/24/18 8276       Kwesi Nathan MD  05/25/18 8363

## 2018-05-25 VITALS
HEART RATE: 79 BPM | TEMPERATURE: 98 F | HEIGHT: 60 IN | OXYGEN SATURATION: 95 % | SYSTOLIC BLOOD PRESSURE: 107 MMHG | DIASTOLIC BLOOD PRESSURE: 79 MMHG | BODY MASS INDEX: 34.22 KG/M2 | WEIGHT: 174.3 LBS | RESPIRATION RATE: 14 BRPM

## 2018-05-25 PROBLEM — R07.9 CHEST PAIN: Status: ACTIVE | Noted: 2018-05-24

## 2018-05-25 LAB
ARTICHOKE IGE QN: 123 MG/DL (ref 0–130)
BH CV ECHO MEAS - AO ROOT AREA (BSA CORRECTED): 2.3
BH CV ECHO MEAS - AO ROOT AREA: 12.4 CM^2
BH CV ECHO MEAS - AO ROOT DIAM: 4 CM
BH CV ECHO MEAS - BSA(HAYCOCK): 1.9 M^2
BH CV ECHO MEAS - BSA: 1.8 M^2
BH CV ECHO MEAS - BZI_BMI: 33.8 KILOGRAMS/M^2
BH CV ECHO MEAS - BZI_METRIC_HEIGHT: 152.4 CM
BH CV ECHO MEAS - BZI_METRIC_WEIGHT: 78.5 KG
BH CV ECHO MEAS - CONTRAST EF (2CH): 21.5 ML/M^2
BH CV ECHO MEAS - CONTRAST EF 4CH: 37.5 ML/M^2
BH CV ECHO MEAS - EDV(CUBED): 98.7 ML
BH CV ECHO MEAS - EDV(MOD-SP2): 121 ML
BH CV ECHO MEAS - EDV(MOD-SP4): 128 ML
BH CV ECHO MEAS - EDV(TEICH): 98.4 ML
BH CV ECHO MEAS - EF(CUBED): 18.5 %
BH CV ECHO MEAS - EF(MOD-BP): 30 %
BH CV ECHO MEAS - EF(MOD-SP2): 21.5 %
BH CV ECHO MEAS - EF(MOD-SP4): 37.5 %
BH CV ECHO MEAS - EF(TEICH): 14.8 %
BH CV ECHO MEAS - ESV(CUBED): 80.4 ML
BH CV ECHO MEAS - ESV(MOD-SP2): 95 ML
BH CV ECHO MEAS - ESV(MOD-SP4): 80 ML
BH CV ECHO MEAS - ESV(TEICH): 83.8 ML
BH CV ECHO MEAS - FS: 6.6 %
BH CV ECHO MEAS - IVS/LVPW: 1.1
BH CV ECHO MEAS - IVSD: 0.97 CM
BH CV ECHO MEAS - LA DIMENSION: 3.3 CM
BH CV ECHO MEAS - LA/AO: 0.82
BH CV ECHO MEAS - LV DIASTOLIC VOL/BSA (35-75): 72.9 ML/M^2
BH CV ECHO MEAS - LV MASS(C)D: 144.4 GRAMS
BH CV ECHO MEAS - LV MASS(C)DI: 82.3 GRAMS/M^2
BH CV ECHO MEAS - LV MAX PG: 1.5 MMHG
BH CV ECHO MEAS - LV MEAN PG: 0.8 MMHG
BH CV ECHO MEAS - LV SYSTOLIC VOL/BSA (12-30): 45.6 ML/M^2
BH CV ECHO MEAS - LV V1 MAX: 62.2 CM/SEC
BH CV ECHO MEAS - LV V1 MEAN: 40.8 CM/SEC
BH CV ECHO MEAS - LV V1 VTI: 11 CM
BH CV ECHO MEAS - LVIDD: 4.6 CM
BH CV ECHO MEAS - LVIDS: 4.3 CM
BH CV ECHO MEAS - LVLD AP2: 7.2 CM
BH CV ECHO MEAS - LVLD AP4: 7.4 CM
BH CV ECHO MEAS - LVLS AP2: 6.4 CM
BH CV ECHO MEAS - LVLS AP4: 6.3 CM
BH CV ECHO MEAS - LVOT AREA (M): 2.5 CM^2
BH CV ECHO MEAS - LVOT AREA: 2.5 CM^2
BH CV ECHO MEAS - LVOT DIAM: 1.8 CM
BH CV ECHO MEAS - LVPWD: 0.89 CM
BH CV ECHO MEAS - MV A MAX VEL: 48.9 CM/SEC
BH CV ECHO MEAS - MV DEC SLOPE: 458.8 CM/SEC^2
BH CV ECHO MEAS - MV E MAX VEL: 72.1 CM/SEC
BH CV ECHO MEAS - MV E/A: 1.5
BH CV ECHO MEAS - MV P1/2T MAX VEL: 80.5 CM/SEC
BH CV ECHO MEAS - MV P1/2T: 51.4 MSEC
BH CV ECHO MEAS - MVA P1/2T LCG: 2.7 CM^2
BH CV ECHO MEAS - MVA(P1/2T): 4.3 CM^2
BH CV ECHO MEAS - PA ACC SLOPE: 519.2 CM/SEC^2
BH CV ECHO MEAS - PA ACC TIME: 0.12 SEC
BH CV ECHO MEAS - PA PR(ACCEL): 25.5 MMHG
BH CV ECHO MEAS - PI END-D VEL: 186.8 CM/SEC
BH CV ECHO MEAS - RVDD: 3.5 CM
BH CV ECHO MEAS - SI(CUBED): 10.4 ML/M^2
BH CV ECHO MEAS - SI(LVOT): 15.9 ML/M^2
BH CV ECHO MEAS - SI(MOD-SP2): 14.8 ML/M^2
BH CV ECHO MEAS - SI(MOD-SP4): 27.3 ML/M^2
BH CV ECHO MEAS - SI(TEICH): 8.3 ML/M^2
BH CV ECHO MEAS - SV(CUBED): 18.3 ML
BH CV ECHO MEAS - SV(LVOT): 27.8 ML
BH CV ECHO MEAS - SV(MOD-SP2): 26 ML
BH CV ECHO MEAS - SV(MOD-SP4): 48 ML
BH CV ECHO MEAS - SV(TEICH): 14.6 ML
BH CV ECHO MEAS - TAPSE (>1.6): 2.2 CM2
BH CV VAS BP LEFT ARM: NORMAL MMHG
BH CV XLRA - RV BASE: 3.6 CM
BH CV XLRA - RV LENGTH: 5.3 CM
BH CV XLRA - RV MID: 3.1 CM
CHOLEST SERPL-MCNC: 201 MG/DL (ref 0–200)
HDLC SERPL-MCNC: 65 MG/DL (ref 40–60)
LEFT ATRIUM VOLUME INDEX: 26 ML/M2
LV EF 2D ECHO EST: 30 %
TRIGL SERPL-MCNC: 115 MG/DL (ref 0–150)
TROPONIN I SERPL-MCNC: 3.25 NG/ML

## 2018-05-25 PROCEDURE — C1760 CLOSURE DEV, VASC: HCPCS | Performed by: INTERNAL MEDICINE

## 2018-05-25 PROCEDURE — 4A023N7 MEASUREMENT OF CARDIAC SAMPLING AND PRESSURE, LEFT HEART, PERCUTANEOUS APPROACH: ICD-10-PCS | Performed by: INTERNAL MEDICINE

## 2018-05-25 PROCEDURE — 84484 ASSAY OF TROPONIN QUANT: CPT | Performed by: NURSE PRACTITIONER

## 2018-05-25 PROCEDURE — B2111ZZ FLUOROSCOPY OF MULTIPLE CORONARY ARTERIES USING LOW OSMOLAR CONTRAST: ICD-10-PCS | Performed by: INTERNAL MEDICINE

## 2018-05-25 PROCEDURE — 99153 MOD SED SAME PHYS/QHP EA: CPT | Performed by: INTERNAL MEDICINE

## 2018-05-25 PROCEDURE — 0 IOPAMIDOL PER 1 ML: Performed by: INTERNAL MEDICINE

## 2018-05-25 PROCEDURE — 99152 MOD SED SAME PHYS/QHP 5/>YRS: CPT | Performed by: INTERNAL MEDICINE

## 2018-05-25 PROCEDURE — C1894 INTRO/SHEATH, NON-LASER: HCPCS | Performed by: INTERNAL MEDICINE

## 2018-05-25 PROCEDURE — 25010000002 FENTANYL CITRATE (PF) 100 MCG/2ML SOLUTION: Performed by: INTERNAL MEDICINE

## 2018-05-25 PROCEDURE — B2151ZZ FLUOROSCOPY OF LEFT HEART USING LOW OSMOLAR CONTRAST: ICD-10-PCS | Performed by: INTERNAL MEDICINE

## 2018-05-25 PROCEDURE — 80061 LIPID PANEL: CPT | Performed by: NURSE PRACTITIONER

## 2018-05-25 PROCEDURE — C1769 GUIDE WIRE: HCPCS | Performed by: INTERNAL MEDICINE

## 2018-05-25 PROCEDURE — 93458 L HRT ARTERY/VENTRICLE ANGIO: CPT | Performed by: INTERNAL MEDICINE

## 2018-05-25 PROCEDURE — 25010000002 MIDAZOLAM PER 1 MG: Performed by: INTERNAL MEDICINE

## 2018-05-25 RX ORDER — SODIUM CHLORIDE 9 MG/ML
INJECTION, SOLUTION INTRAVENOUS CONTINUOUS PRN
Status: COMPLETED | OUTPATIENT
Start: 2018-05-25 | End: 2018-05-25

## 2018-05-25 RX ORDER — FENTANYL CITRATE 50 UG/ML
INJECTION, SOLUTION INTRAMUSCULAR; INTRAVENOUS AS NEEDED
Status: DISCONTINUED | OUTPATIENT
Start: 2018-05-25 | End: 2018-05-25 | Stop reason: HOSPADM

## 2018-05-25 RX ORDER — LIDOCAINE HYDROCHLORIDE 10 MG/ML
INJECTION, SOLUTION EPIDURAL; INFILTRATION; INTRACAUDAL; PERINEURAL AS NEEDED
Status: DISCONTINUED | OUTPATIENT
Start: 2018-05-25 | End: 2018-05-25 | Stop reason: HOSPADM

## 2018-05-25 RX ORDER — MIDAZOLAM HYDROCHLORIDE 1 MG/ML
INJECTION INTRAMUSCULAR; INTRAVENOUS AS NEEDED
Status: DISCONTINUED | OUTPATIENT
Start: 2018-05-25 | End: 2018-05-25 | Stop reason: HOSPADM

## 2018-05-25 RX ADMIN — Medication 1 TABLET: at 08:44

## 2018-05-25 RX ADMIN — SACUBITRIL AND VALSARTAN 1 TABLET: 24; 26 TABLET, FILM COATED ORAL at 08:46

## 2018-05-25 RX ADMIN — ASPIRIN 81 MG: 81 TABLET, COATED ORAL at 08:44

## 2018-05-25 NOTE — PROGRESS NOTES
Discharge Planning Assessment  [facility]     Patient Name: Nicki Dillard  MRN: [id number]  Today's Date: 5/25/2018    Admit Date: 5/24/2018          Discharge Needs Assessment     Row Name 05/25/18 1538       Living Environment    Lives With spouse    Name(s) of Who Lives With Patient Eduardo Dillard    Current Living Arrangements home/apartment/condo    Primary Care Provided by self;spouse/significant other    Provides Primary Care For no one    Family Caregiver if Needed spouse    Family Caregiver Names Eduardo Dillard    Quality of Family Relationships helpful;involved;supportive    Able to Return to Prior Arrangements yes       Resource/Environmental Concerns    Resource/Environmental Concerns none       Transition Planning    Patient/Family Anticipates Transition to home    Patient/Family Anticipated Services at Transition none    Transportation Anticipated family or friend will provide       Discharge Needs Assessment    Readmission Within the Last 30 Days no previous admission in last 30 days    Concerns to be Addressed no discharge needs identified;denies needs/concerns at this time    Equipment Currently Used at Home none    Anticipated Changes Related to Illness none    Equipment Needed After Discharge none            Discharge Plan     Row Name 05/25/18 4351       Plan    Plan Home    Patient/Family in Agreement with Plan yes    Plan Comments Spoke with patient at bedside regarding discharge planning.  Patient denies the use of Home Health or DME.  Patient reports having prescription coverage.  Patient lives with her  in a single level house iwht a basement and ground level access.  CM following.  Patient plan is to discharge home via car with family to transport.      Final Discharge Disposition Code 01 - home or self-care        Destination     No service coordination in this encounter.      Durable Medical Equipment     No service coordination in this encounter.      Dialysis/Infusion      No service coordination in this encounter.      Home Medical Care     No service coordination in this encounter.      Social Care     No service coordination in this encounter.        Expected Discharge Date and Time     Expected Discharge Date Expected Discharge Time    May 26, 2018               Demographic Summary     Row Name 05/25/18 1535       General Information    Admission Type inpatient    Arrived From home    Referral Source admission list    Reason for Consult discharge planning    Preferred Language English     Used During This Interaction no    General Information Comments Wilfred Mendez MD       Contact Information    Permission Granted to Share Info With     Contact Information Obtained for     Contact Information Comments Eduardo Dillard, spouse  [phone]            Functional Status     Row Name 05/25/18 1537       Functional Status    Usual Activity Tolerance excellent    Current Activity Tolerance excellent       Functional Status, IADL    Medications independent    Meal Preparation independent    Housekeeping independent    Laundry independent    Shopping independent       Employment/    Employment/ Comments [employer]            Psychosocial    No documentation.           Abuse/Neglect    No documentation.           Legal    No documentation.           Substance Abuse    No documentation.           Patient Forms    No documentation.         Margarette Levin RN

## 2018-05-25 NOTE — PROGRESS NOTES
Case Management Discharge Note    Final Note: Spoke with patient at bedside regarding discharge planning.  Patient denies the use of Home Health or DME.  Patient reports having prescription coverage.  Patient lives with her  in a single level house iwht a basement and ground level access.  CM following.  Patient plan is to discharge home via car with family to transport.      Destination     No service coordination in this encounter.      Durable Medical Equipment     No service coordination in this encounter.      Dialysis/Infusion     No service coordination in this encounter.      Home Medical Care     No service coordination in this encounter.      Social Care     No service coordination in this encounter.             Final Discharge Disposition Code: 01 - home or self-care

## 2018-05-25 NOTE — PROGRESS NOTES
Patient with no acute events overnight. Remains chest pain free. Last troponin 5.723. Good blood pressure response to lopressor 12.5 mg bid and low dose Entresto.  Lipid panel reviewed. Will address need for statin following LHC today. Echocardiogram: Preliminary EF 25%, evidence of stress-induced (Takotsubo) cardiomyopathy, no significant valvular disease. Patient has been kept NPO after midnight for planned LHC today. The risks, benefits, and alternatives of the procedure have been reviewed and the patient wishes to proceed.     [facility]  5/25/2018  8:20 AM

## 2018-05-25 NOTE — DISCHARGE INSTR - APPOINTMENTS
You have an appointment with Wilfred Mendez MD  on June 19, 2018 @ 1:15 PM.   Call them if you have any questions. Phone: [phone]  [address] [place], [place] [id number]

## 2018-05-25 NOTE — PLAN OF CARE
Problem: Patient Care Overview  Goal: Plan of Care Review  Outcome: Ongoing (interventions implemented as appropriate)   05/25/18 0314   Coping/Psychosocial   Plan of Care Reviewed With patient   Plan of Care Review   Progress no change   OTHER   Outcome Summary Pt came to the floor from the ED around 1830 on 05/24/18. Her troponin is trending up; called a critical troponin to Dr. Bro who instructed to skip remaining troponins during the night as the pt is going for a heart cath on 5/25/18. Pt has slept throughout the shift; Vitals have been stable with patient being in normal sinus rhythm at 0318 on 5/25/18

## 2018-05-25 NOTE — DISCHARGE SUMMARY
[facility]    Inpatient Progress Note/Discharge Summary      Chief Complaint/Reason for service:    · Chest Pain         Subjective:       Patient resting in bed.  Remains chest pain free.  Currently on bedrest post [facility].    Past medical, surgical, social and family history reviewed in the patient's electronic medical record.    Review of Systems:   Negative for exertional chest pain, dyspnea with exertion, lower extremity edema, palpitations.    Problem List  Active Hospital Problems (** Indicates Principal Problem)    Diagnosis Date Noted   • **Chest pain [R07.9] 05/24/2018   • Non-STEMI (non-ST elevated myocardial infarction) [I21.4] 05/24/2018      Resolved Hospital Problems    Diagnosis Date Noted Date Resolved   No resolved problems to display.            Objective:      Current Facility-Administered Medications:   •  [MAR Hold] amoxicillin-clavulanate (AUGMENTIN) 875-125 MG per tablet 1 tablet, 1 tablet, Oral, BID, Caryn B Garland Vila, APRN, 1 tablet at 05/24/18 1921  •  [MAR Hold] aspirin EC tablet 81 mg, 81 mg, Oral, Daily, Caryn B Garland Vila, APRN, 81 mg at 05/25/18 0844  •  metoprolol tartrate (LOPRESSOR) half tablet 12.5 mg, 12.5 mg, Oral, Q12H, Caryn B Garland Vila, APRN, Stopped at 05/25/18 0529  •  [MAR Hold] multivitamin (THERAGRAN) tablet 1 tablet, 1 tablet, Oral, Daily, Caryn B Garland Vila, APRN, 1 tablet at 05/25/18 0844  •  [MAR Hold] sacubitril-valsartan (ENTRESTO) 24-26 MG tablet 1 tablet, 1 tablet, Oral, Q12H, Caryn B Garland Vila, APRN, 1 tablet at 05/25/18 0846  •  [MAR Hold] sodium chloride 0.9 % flush 1-10 mL, 1-10 mL, Intravenous, PRN, Caryn B Garland Vila, APRN  •  [MAR Hold] sodium chloride 0.9 % flush 10 mL, 10 mL, Intravenous, PRN, Kwesi Nathan MD    Vital Sign Min/Max for last 24 hours  Temp  Min: 97.4 °F (36.3 °C)  Max: 98.3 °F (36.8 °C)   BP  Min: 95/69  Max: 143/96   Pulse  Min: 79  Max: 93   Resp  Min: 14  Max: 16   SpO2  Min: 80  %  Max: 100 %   No Data Recorded    No intake or output data in the 24 hours ending 05/25/18 0247        CONSTITUTIONAL: No acute distress, normal affect  RESPIRATORY: Normal effort. Clear to auscultation bilaterally without wheezing or rales  CARDIOVASCULAR: Regular rate and rhythm with normal S1 and S2. Without murmur, gallop or rub.  PERIPHERAL VASCULAR: Normal radial pulses bilaterally. There is no peripheral edema bilaterally. Right femoral site dressing C/D/I, no hematoma present.     Results Review:   I reviewed the patient's recent labs in the electronic medical record.      Tele:  NSR    [facility] 5/25/2018  · The coronary anatomy was normal.  There were no flow limiting, obstructive coronary stenoses.  · Left ventricular ejection fraction of 40% by quantitative analysis.  There is hypokinesis of the mid to distal segments but preservation of apex.  Findings are consistent with an atypical stress-induced cardiomyopathy       Assessment/Plan:     ASSESSMENT:  1) Chest pain/Elevated troponin  - Patient with sudden onset of chest pain a/w lightheadedness and brought on with stress, radiation to back, relieved partially with NTG.   - Recent exertional chest tightness/dyspnea over last month  - Similar presentation in 2014; 2014 with normal coronaries; EF 25% with findings consistent with stress-induced cardiomyopathy  - Troponin trending down.  - Echocardiogram obtained- pending  -[facility] reveals normal coronaries, LVEF 40%, Hypokinesis of the mid to distal segments with preservation of apex, findings consistent with an atypical stress-induced cardiomyopathy.    2) Hypertension  - Hypertensive on arrival.        PLAN:  -Discharge home today in stable condition.  -Follow-up with Heart [facility] Valve clinic in 1 week.  -Follow-up with Dr. Valenzuela in 1 month.  -Discharge home on the following medications.   Nicki Dillard   Home Medication Instructions BROOKE:[id number]    Printed on:05/25/18 4917   Medication Information                       metoprolol tartrate (LOPRESSOR) 25 MG tablet  Take 0.5 tablets by mouth Every 12 (Twelve) Hours.             Multiple Vitamin (MULTI-VITAMIN DAILY PO)  Take 1 tablet by mouth Daily.             nitroglycerin (NITROSTAT) 0.4 MG SL tablet  Place 1 tablet under the tongue Every 5 (Five) Minutes As Needed for Chest Pain. Take no more than 3 doses in 15 minutes.             sacubitril-valsartan (ENTRESTO) 24-26 MG tablet  Take 1 tablet by mouth Every 12 (Twelve) Hours.                 Katlyn Real, APRN  5/25/2018

## 2018-05-29 ENCOUNTER — DOCUMENTATION (OUTPATIENT)
Dept: CARDIAC REHAB | Facility: HOSPITAL | Age: 53
End: 2018-05-29

## 2018-05-30 NOTE — PAYOR COMM NOTE
"Nicki Shah (53 y.o. Female) auth [id number]  Discharge summary     Date of Birth Social Security Number Address Home Phone MRN    1965  [address] [id number] [phone] [id number]    Mosque Marital Status          Adventism        Admission Date Admission Type Admitting Provider Attending Provider Department, Room/Bed    5/24/18 Emergency Serge Valenzuela MD  [facility] 6B, N636/1    Discharge Date Discharge Disposition Discharge Destination        5/25/2018 Home or Self Care Home             Attending Provider:  (none)   Allergies:  No Known Allergies    Isolation:  None   Infection:  None   Code Status:  Prior    Ht:  152.4 cm (60\")   Wt:  79.1 kg (174 lb 4.8 oz)    Admission Cmt:  None   Principal Problem:  Chest pain [R07.9] More...                 Active Insurance as of 5/24/2018     Primary Coverage     Payor Plan Insurance Group Employer/Plan Group    [employer] [facility] [employer] [facility] [id number]     Payor Plan Address Payor Plan Phone Number Effective From Effective To    [address] [phone] 1/1/2008     [facility] [id number]       Subscriber Name Subscriber Birth Date Member ID       EDUARDO SHAH [birth year]/1956 [id number]                 Emergency Contacts      (Rel.) Home Phone Work Phone Mobile Phone    Eduardo Shah (Spouse) [phone] -- --               Discharge Summary      ESTRADA Landry at 5/25/2018  2:16 PM          [facility]    Inpatient Progress Note/Discharge Summary      Chief Complaint/Reason for service:    · Chest Pain         Subjective:       Patient resting in bed.  Remains chest pain free.  Currently on bedrest post [facility].    Past medical, surgical, social and family history reviewed in the patient's electronic medical record.    Review of Systems:   Negative for exertional chest pain, dyspnea with exertion, lower extremity edema, " palpitations.    Problem List  Active Hospital Problems (** Indicates Principal Problem)    Diagnosis Date Noted   • **Chest pain [R07.9] 05/24/2018   • Non-STEMI (non-ST elevated myocardial infarction) [I21.4] 05/24/2018      Resolved Hospital Problems    Diagnosis Date Noted Date Resolved   No resolved problems to display.            Objective:      Current Facility-Administered Medications:   •  [MAR Hold] amoxicillin-clavulanate (AUGMENTIN) 875-125 MG per tablet 1 tablet, 1 tablet, Oral, BID, Caryn B Garland Vila, APRN, 1 tablet at 05/24/18 1921  •  [MAR Hold] aspirin EC tablet 81 mg, 81 mg, Oral, Daily, Caryn B Kuns Vila, APRN, 81 mg at 05/25/18 0844  •  metoprolol tartrate (LOPRESSOR) half tablet 12.5 mg, 12.5 mg, Oral, Q12H, Caryn B Garland Vila, APRN, Stopped at 05/25/18 0529  •  [MAR Hold] multivitamin (THERAGRAN) tablet 1 tablet, 1 tablet, Oral, Daily, Caryn B Piyushs Vila, APRN, 1 tablet at 05/25/18 0844  •  [MAR Hold] sacubitril-valsartan (ENTRESTO) 24-26 MG tablet 1 tablet, 1 tablet, Oral, Q12H, Caryn B Garland Vila, APRN, 1 tablet at 05/25/18 0846  •  [MAR Hold] sodium chloride 0.9 % flush 1-10 mL, 1-10 mL, Intravenous, PRN, Caryn Vila, APRN  •  [MAR Hold] sodium chloride 0.9 % flush 10 mL, 10 mL, Intravenous, PRN, Kwesi Nathan MD    Vital Sign Min/Max for last 24 hours  Temp  Min: 97.4 °F (36.3 °C)  Max: 98.3 °F (36.8 °C)   BP  Min: 95/69  Max: 143/96   Pulse  Min: 79  Max: 93   Resp  Min: 14  Max: 16   SpO2  Min: 80 %  Max: 100 %   No Data Recorded    No intake or output data in the 24 hours ending 05/25/18 1416        CONSTITUTIONAL: No acute distress, normal affect  RESPIRATORY: Normal effort. Clear to auscultation bilaterally without wheezing or rales  CARDIOVASCULAR: Regular rate and rhythm with normal S1 and S2. Without murmur, gallop or rub.  PERIPHERAL VASCULAR: Normal radial pulses bilaterally. There is no peripheral edema bilaterally. Right femoral site dressing C/D/I,  no hematoma present.     Results Review:   I reviewed the patient's recent labs in the electronic medical record.      Tele:  [facility]    [facility] 5/25/2018  · The coronary anatomy was normal.  There were no flow limiting, obstructive coronary stenoses.  · Left ventricular ejection fraction of 40% by quantitative analysis.  There is hypokinesis of the mid to distal segments but preservation of apex.  Findings are consistent with an atypical stress-induced cardiomyopathy       Assessment/Plan:     ASSESSMENT:  1) Chest pain/Elevated troponin  - Patient with sudden onset of chest pain a/w lightheadedness and brought on with stress, radiation to back, relieved partially with NTG.   - Recent exertional chest tightness/dyspnea over last month  - Similar presentation in 2014; 2014 with normal coronaries; EF 25% with findings consistent with stress-induced cardiomyopathy  - Troponin trending down.  - Echocardiogram obtained- pending  -[facility] reveals normal coronaries, LVEF 40%, Hypokinesis of the mid to distal segments with preservation of apex, findings consistent with an atypical stress-induced cardiomyopathy.    2) Hypertension  - Hypertensive on arrival.        PLAN:  -Discharge home today in stable condition.  -Follow-up with [facility] clinic in 1 week.  -Follow-up with Dr. Valenzuela in 1 month.  -Discharge home on the following medications.   Nicki Dillard   Home Medication Instructions BROOKE:[id number]    Printed on:05/25/18 6467   Medication Information                      metoprolol tartrate (LOPRESSOR) 25 MG tablet  Take 0.5 tablets by mouth Every 12 (Twelve) Hours.             Multiple Vitamin (MULTI-VITAMIN DAILY PO)  Take 1 tablet by mouth Daily.             nitroglycerin (NITROSTAT) 0.4 MG SL tablet  Place 1 tablet under the tongue Every 5 (Five) Minutes As Needed for Chest Pain. Take no more than 3 doses in 15 minutes.             sacubitril-valsartan (ENTRESTO) 24-26 MG tablet  Take 1 tablet by mouth Every 12  (Twelve) Hours.                 Katlyn Real, APRN  5/25/2018      Electronically signed by Gerber Doe MD at 5/29/2018  5:25 PM       Discharge Order     Start     Ordered    05/25/18 1547  Discharge patient  Once     Expected Discharge Date:  05/25/18    Discharge Disposition:  Home or Self Care    Physician of Record:  GERBER DOE [[id number]]    Physician of Record selection review needed?:  No       Question Answer Comment   Physician of Record GERBER DOE    Physician of Record selection review needed? No        05/25/18 1546

## 2018-06-01 ENCOUNTER — OFFICE VISIT (OUTPATIENT)
Dept: CARDIOLOGY | Facility: HOSPITAL | Age: 53
End: 2018-06-01

## 2018-06-01 VITALS
DIASTOLIC BLOOD PRESSURE: 81 MMHG | RESPIRATION RATE: 18 BRPM | BODY MASS INDEX: 34.95 KG/M2 | WEIGHT: 178 LBS | SYSTOLIC BLOOD PRESSURE: 127 MMHG | HEIGHT: 60 IN | TEMPERATURE: 97.6 F | OXYGEN SATURATION: 100 % | HEART RATE: 82 BPM

## 2018-06-01 DIAGNOSIS — I51.81 TAKOTSUBO CARDIOMYOPATHY: Primary | ICD-10-CM

## 2018-06-01 DIAGNOSIS — I10 ESSENTIAL HYPERTENSION: ICD-10-CM

## 2018-06-01 PROCEDURE — 99214 OFFICE O/P EST MOD 30 MIN: CPT | Performed by: NURSE PRACTITIONER

## 2018-06-01 NOTE — PROGRESS NOTES
[facility]  [facility]      Encounter Date:06/01/2018     Nicki Dillard  [address] [facility] [place] [address]  [phone]    1965    MD Robbie Johnstonisadora MELENDEZ Nishant is a 53 y.o. female.      Subjective:     Chief Complaint:  Establish Care (post Hospital f/u)       HPI  Patient presents to the office today for ongoing evaluation of her takotsubo CM. She presented to [facility] ED on 5/24/18 with elevated troponins. She was taken to cath lab per Dr Valenzuela and normal coronaries were noted. Takotsubo CM noted. She was started on metoprolol and entresto. She notes that her bps have been 116//94 with hrs 70-80s. She notes a history of takotsubo in 2014. She notes intermittent dizziness and lightheadedness with sudden position changes. Denies chest pain, dyspnea, palpitations, syncope, orthopnea, pnd, abdominal fullness, early satiety, claudication, cough or edema.     Patient Active Problem List   Diagnosis   • Menorrhagia   • Non-STEMI (non-ST elevated myocardial infarction)   • Chest pain   • Hypertension   • Takotsubo cardiomyopathy       No Known Allergies      Current Outpatient Prescriptions:   •  metoprolol tartrate (LOPRESSOR) 25 MG tablet, Take 0.5 tablets by mouth Every 12 (Twelve) Hours., Disp: 30 tablet, Rfl: 11  •  Multiple Vitamin (MULTI-VITAMIN DAILY PO), Take 1 tablet by mouth Daily., Disp: , Rfl:   •  nitroglycerin (NITROSTAT) 0.4 MG SL tablet, Place 1 tablet under the tongue Every 5 (Five) Minutes As Needed for Chest Pain. Take no more than 3 doses in 15 minutes., Disp: 100 tablet, Rfl: 0  •  sacubitril-valsartan (ENTRESTO) 24-26 MG tablet, Take 1 tablet by mouth Every 12 (Twelve) Hours., Disp: 60 tablet, Rfl: 11    The following portions of the patient's history were reviewed and updated as appropriate in Epic:  Problem list, allergies, current medications, past medical and surgical history, past social and family history.     Review of Systems  "  Constitution: Negative. Negative for chills, decreased appetite, diaphoresis, fever, weakness, malaise/fatigue, night sweats, weight gain and weight loss.   HENT: Negative.  Negative for congestion, hearing loss, hoarse voice and nosebleeds.    Eyes: Negative.  Negative for blurred vision, visual disturbance and visual halos.   Cardiovascular: Positive for chest pain (resolved). Negative for claudication, cyanosis, dyspnea on exertion, irregular heartbeat, leg swelling, near-syncope, orthopnea, palpitations, paroxysmal nocturnal dyspnea and syncope.   Respiratory: Positive for sputum production. Negative for cough, hemoptysis, shortness of breath, sleep disturbances due to breathing, snoring and wheezing.    Endocrine: Negative.    Hematologic/Lymphatic: Negative.  Negative for bleeding problem. Does not bruise/bleed easily.   Skin: Negative.  Negative for dry skin, itching and rash.   Musculoskeletal: Positive for joint pain. Negative for arthritis, joint swelling and myalgias.   Gastrointestinal: Negative.  Negative for bloating, abdominal pain, constipation, diarrhea, flatus, heartburn, hematemesis, hematochezia, melena, nausea and vomiting.   Genitourinary: Negative.  Negative for dysuria, frequency, hematuria, nocturia and urgency.   Neurological: Positive for dizziness and light-headedness. Negative for excessive daytime sleepiness, headaches and loss of balance.   Psychiatric/Behavioral: Negative.  Negative for depression. The patient does not have insomnia and is not nervous/anxious.        Objective:     Vitals:    06/01/18 1531 06/01/18 1532 06/01/18 1533   BP: 144/76 129/80 127/81   BP Location: Right arm Left arm Left arm   Patient Position: Sitting Sitting Standing   Pulse: 79 79 82   Resp: 18     Temp: 97.6 °F (36.4 °C)     TempSrc: Temporal Artery      SpO2: 100%     Weight: 80.7 kg (178 lb)     Height: 152.4 cm (60\")           Physical Exam   Constitutional: She is oriented to person, place, and " time. She appears well-developed and well-nourished. She is active and cooperative. No distress.   HENT:   Head: Normocephalic and atraumatic.   Mouth/Throat: Oropharynx is clear and moist.   Eyes: Conjunctivae and EOM are normal. Pupils are equal, round, and reactive to light.   Neck: Normal range of motion. Neck supple. No JVD present. No tracheal deviation present. No thyromegaly present.   Cardiovascular: Normal rate, regular rhythm, normal heart sounds and intact distal pulses.    Pulmonary/Chest: Effort normal and breath sounds normal.   Abdominal: Soft. Bowel sounds are normal. She exhibits no distension. There is no tenderness.   Musculoskeletal: Normal range of motion.   Neurological: She is alert and oriented to person, place, and time.   Skin: Skin is warm, dry and intact.   Psychiatric: She has a normal mood and affect. Her behavior is normal.   Nursing note and vitals reviewed.      Lab and Diagnostic Review:  Lab Results   Component Value Date    GLUCOSE 101 (H) 05/24/2018    CALCIUM 9.1 05/24/2018     05/24/2018    K 3.9 05/24/2018    CO2 25.0 05/24/2018     05/24/2018    BUN 8 (L) 05/24/2018    CREATININE 0.60 05/24/2018    EGFRIFNONA 105 05/24/2018    BCR 13.3 05/24/2018    ANIONGAP 10.0 05/24/2018     Lab Results   Component Value Date    WBC 6.65 05/24/2018    HGB 14.5 05/24/2018    HCT 44.4 (H) 05/24/2018    MCV 88.8 05/24/2018     05/24/2018     · Echo: 5/25/18: Left ventricular systolic function is moderately decreased. Estimated EF = 30%.  · The following left ventricular wall segments are hypokinetic: mid anterior, apical anterior, mid anterolateral, apical lateral, mid inferolateral, apical inferior, mid inferior, apical septal, mid inferoseptal and mid anteroseptal.  · The findings are consistent with stress-induced (Takotsubo) cardiomyopathy.  · Left ventricular diastolic dysfunction (grade II) consistent with pseudonormalization.  Mild mitral valve regurgitation is  present    · [facility] 5/25/18: The coronary anatomy was normal.  There were no flow limiting, obstructive coronary stenoses.  · Left ventricular ejection fraction of 40% by quantitative analysis.  There is hypokinesis of the mid to distal segments but preservation of apex.  Findings are consistent with an atypical stress-induced cardiomyopathy     Assessment and Plan:     1. Takotsubo cardiomyopathy  Continue bb and entresto  Heart failure education today including signs and symptoms, the role of the heart failure center, daily weights, low sodium diet (less than 1500 mg per day), and daily physical activity. Reviewed HF Zones with patient and family.  Patient to continue current medications as previously ordered.     2. Essential hypertension  Under good control on entresto    It has been a pleasure to participate in the care of this patient.  Patient was instructed to call the [facility] Center with any questions, concerns, or worsening symptoms.        *Please note that portions of this note were completed with a voice recognition program. Efforts were made to edit the dictations, but occasionally words are mistranscribed.

## 2018-06-04 PROBLEM — I10 HYPERTENSION: Status: ACTIVE | Noted: 2018-06-04

## 2018-06-04 PROBLEM — M19.90 ARTHRITIS: Status: RESOLVED | Noted: 2018-06-04 | Resolved: 2018-06-04

## 2018-06-04 PROBLEM — I51.81 TAKOTSUBO CARDIOMYOPATHY: Status: ACTIVE | Noted: 2018-06-04

## 2018-06-19 ENCOUNTER — DOCUMENTATION (OUTPATIENT)
Dept: CARDIAC REHAB | Facility: HOSPITAL | Age: 53
End: 2018-06-19

## 2018-06-19 ENCOUNTER — TRANSCRIBE ORDERS (OUTPATIENT)
Dept: CARDIAC REHAB | Facility: HOSPITAL | Age: 53
End: 2018-06-19

## 2018-06-19 DIAGNOSIS — I21.4 NSTEMI (NON-ST ELEVATED MYOCARDIAL INFARCTION) (HCC): Primary | ICD-10-CM

## 2018-06-19 NOTE — PROGRESS NOTES
Pt. Referred for Phase II Cardiac Rehab. Staff discussed benefits of exercise, program protocol, and educational material provided. Teach back verified.  Patient scheduled for orientation at [facility] on Tuesday, July 24th at 1300.

## 2018-07-02 ENCOUNTER — OFFICE VISIT (OUTPATIENT)
Dept: CARDIOLOGY | Facility: CLINIC | Age: 53
End: 2018-07-02

## 2018-07-02 VITALS
WEIGHT: 174.2 LBS | BODY MASS INDEX: 34.2 KG/M2 | DIASTOLIC BLOOD PRESSURE: 70 MMHG | HEIGHT: 60 IN | HEART RATE: 90 BPM | SYSTOLIC BLOOD PRESSURE: 130 MMHG

## 2018-07-02 DIAGNOSIS — I51.81 TAKOTSUBO CARDIOMYOPATHY: ICD-10-CM

## 2018-07-02 DIAGNOSIS — I21.4 NON-STEMI (NON-ST ELEVATED MYOCARDIAL INFARCTION) (HCC): Primary | ICD-10-CM

## 2018-07-02 DIAGNOSIS — I10 ESSENTIAL HYPERTENSION: ICD-10-CM

## 2018-07-02 PROCEDURE — 99213 OFFICE O/P EST LOW 20 MIN: CPT | Performed by: INTERNAL MEDICINE

## 2018-07-02 PROCEDURE — 93000 ELECTROCARDIOGRAM COMPLETE: CPT | Performed by: INTERNAL MEDICINE

## 2018-07-02 NOTE — PROGRESS NOTES
[facility] AT [address], [address]  [place], [place], [address] [phone]  WWW.[facility]           OUTPATIENT CLINIC FOLLOW UP NOTE    Patient Care Team:  Patient Care Team:  Wilfred Mendez MD as PCP - General (Family Medicine)  No Known Provider as PCP - Family Medicine    Subjective:      Chief Complaint   Patient presents with   • Heart Problem       HPI:    Nicki Dillard is a 53 y.o. female.  History of Present Illness  The patient has a history of stress induced cardiomyopathy in 2014 and 2018, essential hypertension, who presents for follow-up.    Over the last 6 weeks since her last visit with the heart valve clinic she has done fairly well.  She has rare intermittent spontaneous episodes of mild chest pain that can last up to 15-30 minutes.  They have not been severe enough to warrant taking nitroglycerin.  They resolve with rest.  Not associated with dyspnea, lower extremity swelling      Review of Systems:  Positive for chest pain  Negative for dyspnea with exertion, orthopnea, PND, lower extremity edema, palpitations, lightheadedness, syncope.    PFSH:  Patient Active Problem List   Diagnosis   • Menorrhagia   • Non-STEMI (non-ST elevated myocardial infarction) (CMS/HCC)   • Chest pain   • Hypertension   • Takotsubo cardiomyopathy         Current Outpatient Prescriptions:   •  metoprolol tartrate (LOPRESSOR) 25 MG tablet, Take 0.5 tablets by mouth Every 12 (Twelve) Hours., Disp: 30 tablet, Rfl: 11  •  Multiple Vitamin (MULTI-VITAMIN DAILY PO), Take 1 tablet by mouth Daily., Disp: , Rfl:   •  nitroglycerin (NITROSTAT) 0.4 MG SL tablet, Place 1 tablet under the tongue Every 5 (Five) Minutes As Needed for Chest Pain. Take no more than 3 doses in 15 minutes., Disp: 100 tablet, Rfl: 0  •  sacubitril-valsartan (ENTRESTO) 24-26 MG tablet, Take 1 tablet by mouth Every 12 (Twelve) Hours., Disp: 60 tablet, Rfl: 11    No Known Allergies     reports that  "she has never smoked. She has never used smokeless tobacco.    Family History   Problem Relation Age of Onset   • Hypertension Father    • Heart disease Mother    • Hypertension Sister    • No Known Problems Brother    • No Known Problems Maternal Grandmother    • No Known Problems Maternal Grandfather    • Arthritis Paternal Grandmother    • Hypertension Paternal Grandfather    • No Known Problems Sister          Objective:   Blood pressure 130/70, pulse 90, height 152.4 cm (60\"), weight 79 kg (174 lb 3.2 oz).  CONSTITUTIONAL: No acute distress, normal affect  RESPIRATORY: Normal effort. Clear to auscultation bilaterally without wheezing or rales  CARDIOVASCULAR: Carotids with normal upstrokes without bruits.  Regular rate and rhythm with normal S1 and S2. Without murmur, gallop or rub.  PERIPHERAL VASCULAR: Normal radial pulses bilaterally. There is no lower extremity edema bilaterally.    Labs:    Glucose   Date Value Ref Range Status   05/24/2018 101 (H) 70 - 100 mg/dL Final     BUN   Date Value Ref Range Status   05/24/2018 8 (L) 9 - 23 mg/dL Final     Creatinine   Date Value Ref Range Status   05/24/2018 0.60 0.60 - 1.30 mg/dL Final     Sodium   Date Value Ref Range Status   05/24/2018 140 132 - 146 mmol/L Final     Potassium   Date Value Ref Range Status   05/24/2018 3.9 3.5 - 5.5 mmol/L Final     Chloride   Date Value Ref Range Status   05/24/2018 105 99 - 109 mmol/L Final     CO2   Date Value Ref Range Status   05/24/2018 25.0 20.0 - 31.0 mmol/L Final     Calcium   Date Value Ref Range Status   05/24/2018 9.1 8.7 - 10.4 mg/dL Final     Total Protein   Date Value Ref Range Status   05/24/2018 7.5 5.7 - 8.2 g/dL Final     Albumin   Date Value Ref Range Status   05/24/2018 4.40 3.20 - 4.80 g/dL Final     ALT (SGPT)   Date Value Ref Range Status   05/24/2018 92 (H) 7 - 40 U/L Final     AST (SGOT)   Date Value Ref Range Status   05/24/2018 68 (H) 0 - 33 U/L Final     Alkaline Phosphatase   Date Value Ref Range " Status   05/24/2018 81 25 - 100 U/L Final     Total Bilirubin   Date Value Ref Range Status   05/24/2018 0.5 0.3 - 1.2 mg/dL Final     eGFR Non  Amer   Date Value Ref Range Status   05/24/2018 105 >60 mL/min/1.73 Final     A/G Ratio   Date Value Ref Range Status   05/24/2018 1.4 (L) 1.5 - 2.5 g/dL Final     BUN/Creatinine Ratio   Date Value Ref Range Status   05/24/2018 13.3 7.0 - 25.0 Final     Anion Gap   Date Value Ref Range Status   05/24/2018 10.0 3.0 - 11.0 mmol/L Final       Lab Results   Component Value Date    CHOL 201 (H) 05/25/2018     Lab Results   Component Value Date    TRIG 115 05/25/2018    TRIG 87 06/27/2014     Lab Results   Component Value Date    HDL 65 (H) 05/25/2018    HDL 48 06/27/2014     No components found for: LDLCALC  No results found for: VLDL  No results found for: LDLHDL    Diagnostic Data:      ECG 12 Lead  Date/Time: 7/2/2018 2:59 PM  Performed by: GERBER DOE  Authorized by: GERBER DOE   Rhythm: sinus rhythm  Comments: Minimal voltage criteria for LVH, nonspecific T-wave abnormality, 74 bpm, QRS 80,             TTE 5/2018  · Left ventricular systolic function is moderately decreased. Estimated EF = 30%.  · The following left ventricular wall segments are hypokinetic: mid anterior, apical anterior, mid anterolateral, apical lateral, mid inferolateral, apical inferior, mid inferior, apical septal, mid inferoseptal and mid anteroseptal.  · The findings are consistent with stress-induced (Takotsubo) cardiomyopathy.  · Left ventricular diastolic dysfunction (grade II) consistent with pseudonormalization.  · Mild mitral valve regurgitation is present    [facility] 5/2018  · The coronary anatomy was normal.  There were no flow limiting, obstructive coronary stenoses.  · Left ventricular ejection fraction of 40% by quantitative analysis.  There is hypokinesis of the mid to distal segments but preservation of apex.  Findings are consistent with an atypical stress-induced  cardiomyopathy      Assessment and Plan:   Nicki was seen today for heart problem.    Diagnoses and all orders for this visit:    Non-STEMI (non-ST elevated myocardial infarction) (CMS/HCC)  Takotsubo cardiomyopathy  Essential hypertension  -Repeat echo in early September  -Continue metoprolol and Entresto  -We will continue to work on her prior authorization for Entresto.  We'll give her additional samples today    - Return in about 6 months (around 1/2/2019).    Serge Valenzuela MD, MSc, FACC

## 2018-07-11 ENCOUNTER — TELEPHONE (OUTPATIENT)
Dept: CARDIOLOGY | Facility: CLINIC | Age: 53
End: 2018-07-11

## 2018-07-11 ENCOUNTER — DOCUMENTATION (OUTPATIENT)
Dept: CARDIOLOGY | Facility: CLINIC | Age: 53
End: 2018-07-11

## 2018-09-06 ENCOUNTER — HOSPITAL ENCOUNTER (OUTPATIENT)
Dept: CARDIOLOGY | Facility: HOSPITAL | Age: 53
Discharge: HOME | End: 2018-09-06
Attending: INTERNAL MEDICINE | Admitting: INTERNAL MEDICINE

## 2018-09-06 DIAGNOSIS — I51.81 TAKOTSUBO CARDIOMYOPATHY: ICD-10-CM

## 2018-09-06 PROCEDURE — 93308 TTE F-UP OR LMTD: CPT | Performed by: INTERNAL MEDICINE

## 2018-09-06 PROCEDURE — 93308 TTE F-UP OR LMTD: CPT

## 2018-09-10 LAB
BH CV ECHO MEAS - AO ROOT AREA (BSA CORRECTED): 1.9
BH CV ECHO MEAS - AO ROOT AREA: 9.2 CM^2
BH CV ECHO MEAS - AO ROOT DIAM: 3.4 CM
BH CV ECHO MEAS - BSA(HAYCOCK): 1.9 M^2
BH CV ECHO MEAS - BSA: 1.8 M^2
BH CV ECHO MEAS - BZI_BMI: 34 KILOGRAMS/M^2
BH CV ECHO MEAS - BZI_METRIC_HEIGHT: 152.4 CM
BH CV ECHO MEAS - BZI_METRIC_WEIGHT: 78.9 KG
BH CV ECHO MEAS - EDV(CUBED): 65.9 ML
BH CV ECHO MEAS - EDV(MOD-SP2): 62 ML
BH CV ECHO MEAS - EDV(MOD-SP4): 88 ML
BH CV ECHO MEAS - EDV(TEICH): 71.7 ML
BH CV ECHO MEAS - EF(CUBED): 72.3 %
BH CV ECHO MEAS - EF(MOD-SP2): 62.9 %
BH CV ECHO MEAS - EF(TEICH): 64.6 %
BH CV ECHO MEAS - ESV(CUBED): 18.3 ML
BH CV ECHO MEAS - ESV(MOD-SP2): 23 ML
BH CV ECHO MEAS - ESV(MOD-SP4): 46 ML
BH CV ECHO MEAS - ESV(TEICH): 25.4 ML
BH CV ECHO MEAS - FS: 34.8 %
BH CV ECHO MEAS - IVS/LVPW: 1
BH CV ECHO MEAS - IVSD: 1.1 CM
BH CV ECHO MEAS - LA DIMENSION: 3.1 CM
BH CV ECHO MEAS - LA/AO: 0.9
BH CV ECHO MEAS - LV DIASTOLIC VOL/BSA (35-75): 50 ML/M^2
BH CV ECHO MEAS - LV MASS(C)D: 138.4 GRAMS
BH CV ECHO MEAS - LV MASS(C)DI: 78.7 GRAMS/M^2
BH CV ECHO MEAS - LV SYSTOLIC VOL/BSA (12-30): 26.1 ML/M^2
BH CV ECHO MEAS - LVIDD: 4 CM
BH CV ECHO MEAS - LVIDS: 2.6 CM
BH CV ECHO MEAS - LVLD AP2: 5.3 CM
BH CV ECHO MEAS - LVLD AP4: 6.5 CM
BH CV ECHO MEAS - LVLS AP2: 4.4 CM
BH CV ECHO MEAS - LVLS AP4: 6.1 CM
BH CV ECHO MEAS - LVPWD: 1 CM
BH CV ECHO MEAS - SI(CUBED): 27.1 ML/M^2
BH CV ECHO MEAS - SI(MOD-SP2): 22.2 ML/M^2
BH CV ECHO MEAS - SI(MOD-SP4): 23.9 ML/M^2
BH CV ECHO MEAS - SI(TEICH): 26.3 ML/M^2
BH CV ECHO MEAS - SV(CUBED): 47.7 ML
BH CV ECHO MEAS - SV(MOD-SP2): 39 ML
BH CV ECHO MEAS - SV(MOD-SP4): 42 ML
BH CV ECHO MEAS - SV(TEICH): 46.3 ML

## 2018-09-14 ENCOUNTER — TELEPHONE (OUTPATIENT)
Dept: CARDIOLOGY | Facility: CLINIC | Age: 53
End: 2018-09-14

## 2018-09-14 NOTE — TELEPHONE ENCOUNTER
Results of echo reviewed with the patient.  Advised that she continue current medications and to please call with any questions or concerns.  Understanding verbalized at this time.

## 2019-01-09 NOTE — PROGRESS NOTES
[facility] AT [address], [address]  [place], [place], [address] [phone]  WWW.[facility]           OUTPATIENT CLINIC FOLLOW UP NOTE    Patient Care Team:  Patient Care Team:  Wilfred Mendez MD as PCP - General (Family Medicine)  Provider, No Known as PCP - Family Medicine    Subjective:      Chief Complaint   Patient presents with   • non-stemi       HPI:    Nicki Dillard is a 53 y.o. female.  History of Present Illness  The patient has a history of stress induced cardiomyopathy in 2014 and 2018, essential hypertension, who presents for follow-up.    Since her last visit the patient has done well clinically.  She has not had recurrent exertional dyspnea, lower extremity swelling.  She did have one episode around Colin time of chest pressure lasting about 15-20 minutes but the symptoms resolved after taking a break.    Review of Systems:  Positive for chest pressure  Negative for dyspnea, lower extremity swelling, palpitations, lightheadedness, syncope    PFSH:  Patient Active Problem List   Diagnosis   • Menorrhagia   • Chest pain   • Hypertension   • Takotsubo cardiomyopathy         Current Outpatient Medications:   •  metoprolol tartrate (LOPRESSOR) 25 MG tablet, Take 0.5 tablets by mouth Every 12 (Twelve) Hours., Disp: 30 tablet, Rfl: 11  •  Multiple Vitamin (MULTI-VITAMIN DAILY PO), Take 1 tablet by mouth Daily., Disp: , Rfl:   •  nitroglycerin (NITROSTAT) 0.4 MG SL tablet, Place 1 tablet under the tongue Every 5 (Five) Minutes As Needed for Chest Pain. Take no more than 3 doses in 15 minutes., Disp: 100 tablet, Rfl: 0  •  sacubitril-valsartan (ENTRESTO) 24-26 MG tablet, Take 1 tablet by mouth Every 12 (Twelve) Hours., Disp: 60 tablet, Rfl: 11    No Known Allergies     reports that  has never smoked. she has never used smokeless tobacco.    Family History   Problem Relation Age of Onset   • Hypertension Father    • Heart disease Mother    •  "Hypertension Sister    • No Known Problems Brother    • No Known Problems Maternal Grandmother    • No Known Problems Maternal Grandfather    • Arthritis Paternal Grandmother    • Hypertension Paternal Grandfather    • No Known Problems Sister          Objective:   Blood pressure 122/86, pulse 88, height 152.4 cm (60\"), weight 78 kg (172 lb), SpO2 98 %.  CONSTITUTIONAL: No acute distress, normal affect  RESPIRATORY: Normal effort. Clear to auscultation bilaterally without wheezing or rales  CARDIOVASCULAR: Regular rate and rhythm with normal S1 and S2. Without murmur, gallop or rub.  PERIPHERAL VASCULAR: Normal radial pulses bilaterally. There is no peripheral edema bilaterally.      Labs:    Glucose   Date Value Ref Range Status   05/24/2018 101 (H) 70 - 100 mg/dL Final     BUN   Date Value Ref Range Status   05/24/2018 8 (L) 9 - 23 mg/dL Final     Creatinine   Date Value Ref Range Status   05/24/2018 0.60 0.60 - 1.30 mg/dL Final     Sodium   Date Value Ref Range Status   05/24/2018 140 132 - 146 mmol/L Final     Potassium   Date Value Ref Range Status   05/24/2018 3.9 3.5 - 5.5 mmol/L Final     Chloride   Date Value Ref Range Status   05/24/2018 105 99 - 109 mmol/L Final     CO2   Date Value Ref Range Status   05/24/2018 25.0 20.0 - 31.0 mmol/L Final     Calcium   Date Value Ref Range Status   05/24/2018 9.1 8.7 - 10.4 mg/dL Final     Total Protein   Date Value Ref Range Status   05/24/2018 7.5 5.7 - 8.2 g/dL Final     Albumin   Date Value Ref Range Status   05/24/2018 4.40 3.20 - 4.80 g/dL Final     ALT (SGPT)   Date Value Ref Range Status   05/24/2018 92 (H) 7 - 40 U/L Final     AST (SGOT)   Date Value Ref Range Status   05/24/2018 68 (H) 0 - 33 U/L Final     Alkaline Phosphatase   Date Value Ref Range Status   05/24/2018 81 25 - 100 U/L Final     Total Bilirubin   Date Value Ref Range Status   05/24/2018 0.5 0.3 - 1.2 mg/dL Final     eGFR Non  Amer   Date Value Ref Range Status   05/24/2018 105 >60 " mL/min/1.73 Final     BUN/Creatinine Ratio   Date Value Ref Range Status   05/24/2018 13.3 7.0 - 25.0 Final     Anion Gap   Date Value Ref Range Status   05/24/2018 10.0 3.0 - 11.0 mmol/L Final       Lab Results   Component Value Date    CHOL 201 (H) 05/25/2018     Lab Results   Component Value Date    TRIG 115 05/25/2018    TRIG 87 06/27/2014     Lab Results   Component Value Date    HDL 65 (H) 05/25/2018    HDL 48 06/27/2014     No components found for: LDLCALC  No results found for: VLDL  No results found for: LDLHDL    Diagnostic Data:    Procedures    Limited TTE 9/2018  · This was a limited echocardiogram to assess for left ventricular ejection fraction only.  · Left ventricular systolic function is normal. Estimated EF appears to be in the range of 56 - 60%.  · Left ventricular wall thickness is consistent with borderline concentric hypertrophy.    TTE 5/2018  · Left ventricular systolic function is moderately decreased. Estimated EF = 30%.  · The following left ventricular wall segments are hypokinetic: mid anterior, apical anterior, mid anterolateral, apical lateral, mid inferolateral, apical inferior, mid inferior, apical septal, mid inferoseptal and mid anteroseptal.  · The findings are consistent with stress-induced (Takotsubo) cardiomyopathy.  · Left ventricular diastolic dysfunction (grade II) consistent with pseudonormalization.  · Mild mitral valve regurgitation is present    [facility] 5/2018  · The coronary anatomy was normal.  There were no flow limiting, obstructive coronary stenoses.  · Left ventricular ejection fraction of 40% by quantitative analysis.  There is hypokinesis of the mid to distal segments but preservation of apex.  Findings are consistent with an atypical stress-induced cardiomyopathy      Assessment and Plan:   Nicki was seen today for heart problem.    Diagnoses and all orders for this visit:    Takotsubo cardiomyopathy  Essential hypertension  -NYHA class I  -Continue metoprolol  and Entresto  -Could consider switching Entresto to an ACE inhibitor in the future, but given that she's had recurrent cardiomyopathy we'll continue with Entresto for now    - If stable at next visit, we'll go to an once a year visit  - Return in about 6 months (around 7/14/2019).    Serge Valenzuela MD, MSc, [facility]  Interventional Cardiology  [facility] at [facility]

## 2019-01-14 ENCOUNTER — OFFICE VISIT (OUTPATIENT)
Dept: CARDIOLOGY | Facility: CLINIC | Age: 54
End: 2019-01-14

## 2019-01-14 VITALS
SYSTOLIC BLOOD PRESSURE: 122 MMHG | OXYGEN SATURATION: 98 % | HEIGHT: 60 IN | BODY MASS INDEX: 33.77 KG/M2 | DIASTOLIC BLOOD PRESSURE: 86 MMHG | HEART RATE: 88 BPM | WEIGHT: 172 LBS

## 2019-01-14 DIAGNOSIS — I51.81 TAKOTSUBO CARDIOMYOPATHY: Primary | ICD-10-CM

## 2019-01-14 DIAGNOSIS — I10 ESSENTIAL HYPERTENSION: ICD-10-CM

## 2019-01-14 PROBLEM — I21.4 NON-STEMI (NON-ST ELEVATED MYOCARDIAL INFARCTION) (HCC): Status: RESOLVED | Noted: 2018-05-24 | Resolved: 2019-01-14

## 2019-01-14 PROCEDURE — 99213 OFFICE O/P EST LOW 20 MIN: CPT | Performed by: INTERNAL MEDICINE

## 2019-02-01 ENCOUNTER — OFFICE VISIT (OUTPATIENT)
Dept: OBSTETRICS AND GYNECOLOGY | Facility: CLINIC | Age: 54
End: 2019-02-01

## 2019-02-01 VITALS — WEIGHT: 173 LBS | DIASTOLIC BLOOD PRESSURE: 74 MMHG | SYSTOLIC BLOOD PRESSURE: 128 MMHG | BODY MASS INDEX: 33.79 KG/M2

## 2019-02-01 DIAGNOSIS — Z01.419 WELL WOMAN EXAM: Primary | ICD-10-CM

## 2019-02-01 PROCEDURE — 99396 PREV VISIT EST AGE 40-64: CPT | Performed by: OBSTETRICS & GYNECOLOGY

## 2019-02-01 NOTE — PROGRESS NOTES
Subjective   Chief Complaint   Patient presents with   • Gynecologic Exam     Annual exam, reports small painless nodule on left labia     Nicki Dillard is a 54 y.o. year old  menopausal female presenting to be seen for her annual exam.  This past year she has not been on hormone replacement therapy.  There has not been vaginal bleeding in the last 12 months.  Menopausal symptoms are present (hot flashes) but not affecting her quality of life .    SEXUAL Hx:  She is currently sexually active.  In the past year there there has been NO new sexual partners.    Condoms are never used.  She would not like to be screened for STD's at today's exam.  [place] is painful: no  HEALTH Hx:  She exercises regularly: no (but is planning to start exercising more ).  She wears her seat belt: yes.  She has concerns about domestic violence: no.  She has noticed changes in height: not asked.  OTHER THINGS SHE WANTS TO DISCUSS TODAY:  Noticed a small painless bump on her left labia inside the lip about a year ago. May have slightly grown. Not bothersome, just noticed it.     The following portions of the patient's history were reviewed and updated as appropriate:problem list, current medications, allergies, past family history, past medical history, past social history and past surgical history.    Social History    Tobacco Use      Smoking status: Never Smoker      Smokeless tobacco: Never Used      Review of Systems  Constitutional POS: nothing reported    NEG: anorexia or night sweats   Genitourinary POS: DEREK is present but it IS NOT effecting her ADL's    NEG: dysuria or hematuria      Gastointestinal POS: nothing reported    NEG: bloating, change in bowel habits, melena or reflux symptoms   Integument POS: nothing reported    NEG: moles that are changing in size, shape, color or rashes   Breast POS: nothing reported    NEG: persistent breast lump, skin dimpling or nipple discharge        Objective   /74  (Patient Position: Sitting)   Wt 78.5 kg (173 lb)   LMP 07/01/2017   BMI 33.79 kg/m²     General:  well developed; well nourished  no acute distress   Skin:  No suspicious lesions seen   Thyroid: normal to inspection and palpation   Breasts:  Examined in supine position  Symmetric without masses or skin dimpling  Nipples normal without inversion, lesions or discharge  There are no palpable axillary nodes   Abdomen: soft, non-tender; no masses  no umbilical or inguinal hernias are present  no hepato-splenomegaly   Pelvis: Clinical staff was present for exam  External genitalia:  normal appearance of the external genitalia including Bartholin's and [place]'s glands.  :  urethral meatus normal;  Vaginal:  normal pink mucosa without prolapse or lesions.  Cervix:  normal appearance.  Uterus:  normal size, shape and consistency.  Adnexa:  normal bimanual exam of the adnexa.  Rectal:  digital rectal exam not performed; anus visually normal appearing.        Assessment   1. Normal GYN exam in menopause  2. She is up to date on all relevant gynecologic and colorectal screenings except colonoscopy   3. Hot flashes not affecting ADLs  4. Stress urinary incontinence not affecting ADLs  5. No lesions seen on labia today, ?possibly varicose veins      Plan   1. Pap was not done today.  I explained to Nicki that the recommendations for Pap smear interval in a low risk patient has lengthened to 3 years time.  I told Nicki she still needs to be seen in our office yearly for a full physical including breast and pelvic exam.  2. Recommended decreased caffeine intake, weight loss, and kegel exercises in regards to DEREK  3. She was encouraged to get yearly mammograms.  She should report any palpable breast lump(s) or skin changes regardless of mammographic findings.  I explained to Nicki that notification regarding her mammogram results will come from the center performing the study.  Our office will not be routinely  calling with mammogram results.  It is her responsibility to make sure that the results from the mammogram are communicated to her by the breast center.  If she has any questions about the results, she is welcome to call our office anytime.  4. Colonoscopy was recommended for screening for colon cancer.  The procedure was briefly discussed and its benefits for early detection of colon cancer were emphasized.  I explained to Nicki that we could help her to schedule it if she wishes.  Additionally, she could also contact her primary care physician to help make this arrangement.  After considering these options she wants help setting up her colonoscopy.  Referral will be made to Dr. Abdi for outpatient colonoscopy.  5. DEXA to start at age 60-65  6. The importance of keeping all planned follow-up and taking all medications as prescribed was emphasized.  7. Follow up for annual exam in one year    No orders of the defined types were placed in this encounter.         This note was electronically signed.    Bernadette Padilla MD  February 1, 2019    Note: Speech recognition transcription software may have been used to create portions of this document.  An attempt at proofreading has been made but errors in transcription could still be present.

## 2019-05-13 RX ORDER — SODIUM, POTASSIUM,MAG SULFATES 17.5-3.13G
SOLUTION, RECONSTITUTED, ORAL ORAL
Qty: 2 BOTTLE | Refills: 0 | Status: SHIPPED | OUTPATIENT
Start: 2019-05-13 | End: 2020-08-10

## 2019-05-28 RX ORDER — SACUBITRIL AND VALSARTAN 24; 26 MG/1; MG/1
TABLET, FILM COATED ORAL
Qty: 60 TABLET | Refills: 11 | Status: SHIPPED | OUTPATIENT
Start: 2019-05-28 | End: 2020-06-30 | Stop reason: SDUPTHER

## 2019-08-05 ENCOUNTER — OFFICE VISIT (OUTPATIENT)
Dept: CARDIOLOGY | Facility: CLINIC | Age: 54
End: 2019-08-05

## 2019-08-05 VITALS
BODY MASS INDEX: 33.26 KG/M2 | HEIGHT: 60 IN | WEIGHT: 169.4 LBS | HEART RATE: 84 BPM | DIASTOLIC BLOOD PRESSURE: 80 MMHG | SYSTOLIC BLOOD PRESSURE: 118 MMHG | OXYGEN SATURATION: 98 %

## 2019-08-05 DIAGNOSIS — I51.81 TAKOTSUBO CARDIOMYOPATHY: Primary | ICD-10-CM

## 2019-08-05 DIAGNOSIS — I10 ESSENTIAL HYPERTENSION: ICD-10-CM

## 2019-08-05 PROCEDURE — 99213 OFFICE O/P EST LOW 20 MIN: CPT | Performed by: NURSE PRACTITIONER

## 2019-08-05 NOTE — PROGRESS NOTES
[facility] AT [address], [address]  [place], [place], [address] [phone]  [facility]           OUTPATIENT CLINIC FOLLOW UP NOTE    Patient Care Team:  Patient Care Team:  Wilfred Mendez MD as PCP - General (Family Medicine)  Provider, No Known as PCP - Family Medicine  Bernadette Padilla MD as Consulting Physician (Obstetrics and Gynecology)    Subjective:      Chief Complaint   Patient presents with   • Hypertension       HPI:    Nicki Dillard is a 54 y.o. female.  The patient has a history of stress induced cardiomyopathy in 2014 and 2018, essential hypertension, who presents for follow-up.    Since the patient was last seen she reports that she has been doing well from a cardiac standpoint.  She denies any chest pain, shortness of breath, lower extremity edema, lightheadedness, syncope, or palpitations.  Her blood pressure has been at goal.    Review of Systems:  Negative for chest pain, dyspnea, lower extremity swelling, palpitations, lightheadedness, syncope.    PFSH:  Patient Active Problem List   Diagnosis   • Menorrhagia   • Chest pain   • Hypertension   • Takotsubo cardiomyopathy   • Well woman exam         Current Outpatient Medications:   •  ENTRESTO 24-26 MG tablet, TAKE 1 TABLET BY MOUTH EVERY 12 HOURS, Disp: 60 tablet, Rfl: 11  •  metoprolol tartrate (LOPRESSOR) 25 MG tablet, Take 0.5 tablets by mouth Every 12 (Twelve) Hours., Disp: 30 tablet, Rfl: 11  •  Multiple Vitamin (MULTI-VITAMIN DAILY PO), Take 1 tablet by mouth Daily., Disp: , Rfl:   •  nitroglycerin (NITROSTAT) 0.4 MG SL tablet, Place 1 tablet under the tongue Every 5 (Five) Minutes As Needed for Chest Pain. Take no more than 3 doses in 15 minutes., Disp: 100 tablet, Rfl: 0  •  sodium-potassium-magnesium sulfates (SUPREP BOWEL PREP KIT) 17.5-3.13-1.6 GM/177ML solution oral solution, Please follow the directions mailed to you by our office. If you have any questions call our  "office at [phone], Disp: 2 bottle, Rfl: 0    No Known Allergies     reports that she has never smoked. She has never used smokeless tobacco.    Family History   Problem Relation Age of Onset   • Hypertension Father    • Heart disease Mother    • Hypertension Sister    • No Known Problems Brother    • No Known Problems Maternal Grandmother    • No Known Problems Maternal Grandfather    • Arthritis Paternal Grandmother    • Hypertension Paternal Grandfather    • No Known Problems Sister    • Breast cancer Neg Hx    • Ovarian cancer Neg Hx    • Colon cancer Neg Hx          Objective:   Blood pressure 118/80, pulse 84, height 152.4 cm (60\"), weight 76.8 kg (169 lb 6.4 oz), last menstrual period 07/01/2017, SpO2 98 %.  CONSTITUTIONAL: No acute distress, normal affect  RESPIRATORY: Normal effort. Clear to auscultation bilaterally without wheezing or rales.  CARDIOVASCULAR: Regular rate and rhythm with normal S1 and S2. Without murmur, gallop or rub.  PERIPHERAL VASCULAR: Normal radial pulses bilaterally. There is no peripheral edema bilaterally.    Labs:    Glucose   Date Value Ref Range Status   05/24/2018 101 (H) 70 - 100 mg/dL Final     BUN   Date Value Ref Range Status   05/24/2018 8 (L) 9 - 23 mg/dL Final     Creatinine   Date Value Ref Range Status   05/24/2018 0.60 0.60 - 1.30 mg/dL Final     Sodium   Date Value Ref Range Status   05/24/2018 140 132 - 146 mmol/L Final     Potassium   Date Value Ref Range Status   05/24/2018 3.9 3.5 - 5.5 mmol/L Final     Chloride   Date Value Ref Range Status   05/24/2018 105 99 - 109 mmol/L Final     CO2   Date Value Ref Range Status   05/24/2018 25.0 20.0 - 31.0 mmol/L Final     Calcium   Date Value Ref Range Status   05/24/2018 9.1 8.7 - 10.4 mg/dL Final     Total Protein   Date Value Ref Range Status   05/24/2018 7.5 5.7 - 8.2 g/dL Final     Albumin   Date Value Ref Range Status   05/24/2018 4.40 3.20 - 4.80 g/dL Final     ALT (SGPT)   Date Value Ref Range Status "   05/24/2018 92 (H) 7 - 40 U/L Final     AST (SGOT)   Date Value Ref Range Status   05/24/2018 68 (H) 0 - 33 U/L Final     Alkaline Phosphatase   Date Value Ref Range Status   05/24/2018 81 25 - 100 U/L Final     Total Bilirubin   Date Value Ref Range Status   05/24/2018 0.5 0.3 - 1.2 mg/dL Final     eGFR Non  Amer   Date Value Ref Range Status   05/24/2018 105 >60 mL/min/1.73 Final     BUN/Creatinine Ratio   Date Value Ref Range Status   05/24/2018 13.3 7.0 - 25.0 Final     Anion Gap   Date Value Ref Range Status   05/24/2018 10.0 3.0 - 11.0 mmol/L Final       Lab Results   Component Value Date    CHOL 201 (H) 05/25/2018     Lab Results   Component Value Date    TRIG 115 05/25/2018    TRIG 87 06/27/2014     Lab Results   Component Value Date    HDL 65 (H) 05/25/2018    HDL 48 06/27/2014     No components found for: LDLCALC  No results found for: VLDL  No results found for: LDLHDL    Diagnostic Data:    Procedures    Limited TTE 9/2018  · This was a limited echocardiogram to assess for left ventricular ejection fraction only.  · Left ventricular systolic function is normal. Estimated EF appears to be in the range of 56 - 60%.  · Left ventricular wall thickness is consistent with borderline concentric hypertrophy.    TTE 5/2018  · Left ventricular systolic function is moderately decreased. Estimated EF = 30%.  · The following left ventricular wall segments are hypokinetic: mid anterior, apical anterior, mid anterolateral, apical lateral, mid inferolateral, apical inferior, mid inferior, apical septal, mid inferoseptal and mid anteroseptal.  · The findings are consistent with stress-induced (Takotsubo) cardiomyopathy.  · Left ventricular diastolic dysfunction (grade II) consistent with pseudonormalization.  · Mild mitral valve regurgitation is present    [facility] 5/2018  · The coronary anatomy was normal.  There were no flow limiting, obstructive coronary stenoses.  · Left ventricular ejection fraction of 40% by  quantitative analysis.  There is hypokinesis of the mid to distal segments but preservation of apex.  Findings are consistent with an atypical stress-induced cardiomyopathy      Assessment and Plan:   Nicki was seen today for heart problem.    Diagnoses and all orders for this visit:    Takotsubo cardiomyopathy  Essential hypertension  -NYHA class I  -Continue metoprolol and Entresto.  -Nitroglycerin sublingual as needed for chest pain.    - Return in about 1 year (around 8/5/2020).    Katlyn Real, ESTRADA  08/05/19 1:53 PM

## 2019-12-19 ENCOUNTER — IMMUNIZATION (OUTPATIENT)
Dept: RETAIL CLINIC | Facility: CLINIC | Age: 54
End: 2019-12-19

## 2019-12-19 DIAGNOSIS — Z23 FLU VACCINE NEED: Primary | ICD-10-CM

## 2019-12-19 PROCEDURE — 90471 IMMUNIZATION ADMIN: CPT | Performed by: NURSE PRACTITIONER

## 2019-12-19 PROCEDURE — 90686 IIV4 VACC NO PRSV 0.5 ML IM: CPT | Performed by: NURSE PRACTITIONER

## 2019-12-19 NOTE — PATIENT INSTRUCTIONS
Influenza (Flu) Vaccine (Inactivated or Recombinant): What You Need to Know  1. Why get vaccinated?  Influenza vaccine can prevent influenza (flu).  Flu is a contagious disease that spreads around the [place] every year, usually between October and May. Anyone can get the flu, but it is more dangerous for some people. Infants and young children, people 65 years of age and older, pregnant women, and people with certain health conditions or a weakened immune system are at greatest risk of flu complications.  Pneumonia, bronchitis, sinus infections and ear infections are examples of flu-related complications. If you have a medical condition, such as heart disease, cancer or diabetes, flu can make it worse.  Flu can cause fever and chills, sore throat, muscle aches, fatigue, cough, headache, and runny or stuffy nose. Some people may have vomiting and diarrhea, though this is more common in children than adults.  Each year thousands of people in the [place] die from flu, and many more are hospitalized. Flu vaccine prevents millions of illnesses and flu-related visits to the doctor each year.  2. Influenza vaccine  CDC recommends everyone 6 months of age and older get vaccinated every flu season. Children 6 months through 8 years of age may need 2 doses during a single flu season. Everyone else needs only 1 dose each flu season.  It takes about 2 weeks for protection to develop after vaccination.  There are many flu viruses, and they are always changing. Each year a new flu vaccine is made to protect against three or four viruses that are likely to cause disease in the upcoming flu season. Even when the vaccine doesn't exactly match these viruses, it may still provide some protection.  Influenza vaccine does not cause flu.  Influenza vaccine may be given at the same time as other vaccines.  3. Talk with your health care provider  Tell your vaccine provider if the person getting the vaccine:  · Has had an  allergic reaction after a previous dose of influenza vaccine, or has any severe, life-threatening allergies.  · Has ever had Guillain-Barré Syndrome (also called GBS).  In some cases, your health care provider may decide to postpone influenza vaccination to a future visit.  People with minor illnesses, such as a cold, may be vaccinated. People who are moderately or severely ill should usually wait until they recover before getting influenza vaccine.  Your health care provider can give you more information.  4. Risks of a vaccine reaction  · Soreness, redness, and swelling where shot is given, fever, muscle aches, and headache can happen after influenza vaccine.  · There may be a very small increased risk of Guillain-Barré Syndrome (GBS) after inactivated influenza vaccine (the flu shot).  Young children who get the flu shot along with pneumococcal vaccine (PCV13), and/or DTaP vaccine at the same time might be slightly more likely to have a seizure caused by fever. Tell your health care provider if a child who is getting flu vaccine has ever had a seizure.  People sometimes faint after medical procedures, including vaccination. Tell your provider if you feel dizzy or have vision changes or ringing in the ears.  As with any medicine, there is a very remote chance of a vaccine causing a severe allergic reaction, other serious injury, or death.  5. What if there is a serious problem?  An allergic reaction could occur after the vaccinated person leaves the clinic. If you see signs of a severe allergic reaction (hives, swelling of the face and throat, difficulty breathing, a fast heartbeat, dizziness, or weakness), call 9-1-1 and get the person to the nearest hospital.  For other signs that concern you, call your health care provider.  Adverse reactions should be reported to the Vaccine Adverse Event Reporting System (VAERS). Your health care provider will usually file this report, or you can do it yourself. Visit the  VAERS website at www.vaers.[facility].gov or call 1-574.757.5830.VAERS is only for reporting reactions, and VAERS staff do not give medical advice.  6. The National Vaccine Injury Compensation Program  The National Vaccine Injury Compensation Program (VICP) is a federal program that was created to compensate people who may have been injured by certain vaccines. Visit the VICP website at www.[facility].gov/vaccinecompensation or call 1-387.322.2723 to learn about the program and about filing a claim. There is a time limit to file a claim for compensation.  7. How can I learn more?  · Ask your healthcare provider.  · Call your local or state health department.  · Contact the Centers for Disease Control and Prevention (CDC):  ? Call 1-726.572.9091 (1-277-WZP-INFO) or  ? Visit CDC's www.cdc.gov/flu  Vaccine Information Statement (Interim) Inactivated Influenza Vaccine (8/15/2019)  This information is not intended to replace advice given to you by your health care provider. Make sure you discuss any questions you have with your health care provider.  Document Released: 10/12/2007 Document Revised: 08/19/2019 Document Reviewed: 08/19/2019  Elsevier Interactive Patient Education © 2019 Elsevier Inc.

## 2019-12-19 NOTE — PROGRESS NOTES
S: Requests Flu Vaccine.  Is feeling well today. Denies previous complications of flu vaccine, denies serious egg allergy. [employer] consent obtained.  O: Appears well today.  A: Vaccination against Influenza   P: Age appropriate flu vaccine administered (see vaccine immunization record) Tolerated Well.  Discussed that they may feel achy and fatigued in next day or 2, as their immune system is responding, this is not the flu. Advised that can take tylenol or ibuprofen per package instructions for soreness if needed. Also explained that it takes up to 2 weeks for full immune response. Encouraged general health hygiene. Vaccine obtained from [employer]. ESTRADA Acuña

## 2020-02-03 ENCOUNTER — OFFICE VISIT (OUTPATIENT)
Dept: OBSTETRICS AND GYNECOLOGY | Facility: CLINIC | Age: 55
End: 2020-02-03

## 2020-02-03 VITALS
SYSTOLIC BLOOD PRESSURE: 132 MMHG | BODY MASS INDEX: 32.08 KG/M2 | DIASTOLIC BLOOD PRESSURE: 86 MMHG | WEIGHT: 163.4 LBS | HEIGHT: 60 IN

## 2020-02-03 DIAGNOSIS — Z12.39 BREAST CANCER SCREENING: ICD-10-CM

## 2020-02-03 DIAGNOSIS — N39.3 SUI (STRESS URINARY INCONTINENCE, FEMALE): ICD-10-CM

## 2020-02-03 DIAGNOSIS — Z01.419 WELL WOMAN EXAM: Primary | ICD-10-CM

## 2020-02-03 PROCEDURE — 99396 PREV VISIT EST AGE 40-64: CPT | Performed by: OBSTETRICS & GYNECOLOGY

## 2020-02-03 RX ORDER — KETOROLAC TROMETHAMINE 30 MG/ML
INJECTION, SOLUTION INTRAMUSCULAR; INTRAVENOUS
COMMUNITY
End: 2020-02-03

## 2020-02-03 NOTE — PROGRESS NOTES
"Subjective   Chief Complaint   Patient presents with   • Gynecologic Exam     pt here for annual. last pap 12/15/17 negative, last mammo  negative. pt states no c/o     Nicki Dillard is a 55 y.o. year old  menopausal female presenting to be seen for her annual exam.  This past year she has not been on hormone replacement therapy.  There has not been vaginal bleeding in the last 12 months.  Menopausal symptoms are present (hot flashes) but not affecting her quality of life .    SEXUAL Hx:  She is currently sexually active.  In the past year there there has been NO new sexual partners.    Condoms are never used.  She would not like to be screened for STD's at today's exam.  [place] is painful: no  HEALTH Hx:  She exercises regularly: yes.  She wears her seat belt: yes.  She has concerns about domestic violence: no.  OTHER THINGS SHE WANTS TO DISCUSS TODAY:  Nothing else    The following portions of the patient's history were reviewed and updated as appropriate:problem list, current medications, allergies, past family history, past medical history, past social history and past surgical history.    Social History    Tobacco Use      Smoking status: Never Smoker      Smokeless tobacco: Never Used      Review of Systems  Constitutional POS: nothing reported    NEG: anorexia or night sweats   Genitourinary POS: DEREK is present but it IS NOT effecting her ADL's    NEG: dysuria or hematuria      Gastointestinal POS: nothing reported    NEG: bloating, change in bowel habits, melena or reflux symptoms   Integument POS: nothing reported    NEG: moles that are changing in size, shape, color or rashes   Breast POS: nothing reported    NEG: persistent breast lump, skin dimpling or nipple discharge        Objective   /86   Ht 152.4 cm (60\")   Wt 74.1 kg (163 lb 6.4 oz)   LMP 2017   Breastfeeding No   BMI 31.91 kg/m²     General:  well developed; well nourished  no acute distress   Skin:  No " suspicious lesions seen   Thyroid: normal to inspection and palpation   Breasts:  Examined in supine position  Symmetric without masses or skin dimpling  Nipples normal without inversion, lesions or discharge  There are no palpable axillary nodes   Abdomen: soft, non-tender; no masses  no umbilical or inguinal hernias are present  no hepato-splenomegaly   Pelvis: Clinical staff was present for exam  External genitalia:  normal appearance of the external genitalia including Bartholin's and Selah's glands.  :  urethral meatus normal;  Vaginal:  normal pink mucosa without prolapse or lesions.  Cervix:  normal appearance.  Uterus:  normal size, shape and consistency.  Adnexa:  normal bimanual exam of the adnexa.  Rectal:  anus visually normal appearing. recto-vaginal exam unremarkable and confirms findings;        Assessment   1. Normal GYN exam in menopause  2. She is up to date on all relevant gynecologic and colorectal screenings except mammography and colon cancer screening   3. DEREK not affecting ADLs     Plan   Pap was not done today.  I explained to Nicki that the recommendations for Pap smear interval in a low risk patient has lengthened to 5 years time if previous pap co test was performed.  I told Nicki she still needs to be seen in our office yearly for a full physical including breast and pelvic exam.  She was encouraged to get yearly mammograms.  She should report any palpable breast lump(s) or skin changes regardless of mammographic findings.  I explained to Nicki that notification regarding her mammogram results will come from the center performing the study.  Our office will not be routinely calling with mammogram results.  It is her responsibility to make sure that the results from the mammogram are communicated to her by the breast center.  If she has any questions about the results, she is welcome to call our office anytime.  Colonoscopy was recommended for screening for colon cancer.   The procedure was briefly discussed and its benefits for early detection of colon cancer were emphasized.  I explained to Nicki that we could help her to schedule it if she wishes.  Additionally, she could also contact her primary care physician to help make this arrangement.  After considering these options she will contact her PCP to arrange colonoscopy.  The importance of keeping all planned follow-up and taking all medications as prescribed was emphasized.  Follow up for annual exam in one year    No orders of the defined types were placed in this encounter.         This note was electronically signed.    Bernadette Padilla MD  February 3, 2020    Note: Speech recognition transcription software may have been used to create portions of this document.  An attempt at proofreading has been made but errors in transcription could still be present.

## 2020-04-20 ENCOUNTER — APPOINTMENT (OUTPATIENT)
Dept: MAMMOGRAPHY | Facility: HOSPITAL | Age: 55
End: 2020-04-20

## 2020-06-20 ENCOUNTER — APPOINTMENT (OUTPATIENT)
Dept: OTHER | Facility: HOSPITAL | Age: 55
End: 2020-06-20

## 2020-06-20 ENCOUNTER — HOSPITAL ENCOUNTER (OUTPATIENT)
Dept: MAMMOGRAPHY | Facility: HOSPITAL | Age: 55
Discharge: HOME | End: 2020-06-20
Admitting: OBSTETRICS & GYNECOLOGY

## 2020-06-20 DIAGNOSIS — Z12.39 BREAST CANCER SCREENING: ICD-10-CM

## 2020-06-20 PROCEDURE — 77063 BREAST TOMOSYNTHESIS BI: CPT

## 2020-06-20 PROCEDURE — 77063 BREAST TOMOSYNTHESIS BI: CPT | Performed by: RADIOLOGY

## 2020-06-20 PROCEDURE — 77067 SCR MAMMO BI INCL CAD: CPT

## 2020-06-20 PROCEDURE — 77067 SCR MAMMO BI INCL CAD: CPT | Performed by: RADIOLOGY

## 2020-06-26 ENCOUNTER — APPOINTMENT (OUTPATIENT)
Dept: MAMMOGRAPHY | Facility: HOSPITAL | Age: 55
End: 2020-06-26

## 2020-08-10 ENCOUNTER — OFFICE VISIT (OUTPATIENT)
Dept: CARDIOLOGY | Facility: CLINIC | Age: 55
End: 2020-08-10

## 2020-08-10 VITALS
SYSTOLIC BLOOD PRESSURE: 140 MMHG | HEART RATE: 78 BPM | OXYGEN SATURATION: 99 % | HEIGHT: 60 IN | BODY MASS INDEX: 31.22 KG/M2 | WEIGHT: 159 LBS | DIASTOLIC BLOOD PRESSURE: 88 MMHG | TEMPERATURE: 98 F

## 2020-08-10 DIAGNOSIS — I10 ESSENTIAL HYPERTENSION: ICD-10-CM

## 2020-08-10 DIAGNOSIS — I51.81 TAKOTSUBO CARDIOMYOPATHY: Primary | ICD-10-CM

## 2020-08-10 PROCEDURE — 99213 OFFICE O/P EST LOW 20 MIN: CPT | Performed by: INTERNAL MEDICINE

## 2020-08-10 NOTE — PROGRESS NOTES
[facility] AT [address], [address]  [place], [place], [address] [phone]  WWW.[facility]           OUTPATIENT CLINIC FOLLOW UP NOTE    Patient Care Team:  Patient Care Team:  Provider, No Known as PCP - General    Subjective:      Chief Complaint   Patient presents with   • Takotsubo cardiomyopathy       HPI:    Nicki Dillard is a 55 y.o. female.  Employee here at [employer]  The patient has a history of stress induced cardiomyopathy in 2014 and 2018, essential hypertension, who presents for follow-up.    Since the patient was last seen she reports that she has been doing well from a cardiac standpoint.  She denies any chest pain, shortness of breath, lower extremity edema, lightheadedness, syncope, or palpitations.      Does not check her blood pressure at home    Review of Systems:  Negative for chest pain, dyspnea, lower extremity swelling, palpitations, lightheadedness, syncope.    PFSH:  Patient Active Problem List   Diagnosis   • Menorrhagia   • Chest pain   • Hypertension   • Takotsubo cardiomyopathy   • Well woman exam         Current Outpatient Medications:   •  metoprolol tartrate (LOPRESSOR) 25 MG tablet, Take 0.5 tablets by mouth Every 12 (Twelve) Hours., Disp: 90 tablet, Rfl: 0  •  Multiple Vitamin (MULTI-VITAMIN DAILY PO), Take 1 tablet by mouth Daily., Disp: , Rfl:   •  nitroglycerin (NITROSTAT) 0.4 MG SL tablet, Place 1 tablet under the tongue Every 5 (Five) Minutes As Needed for Chest Pain. Take no more than 3 doses in 15 minutes., Disp: 100 tablet, Rfl: 0  •  sacubitril-valsartan (Entresto) 24-26 MG tablet, Take 1 tablet by mouth Every 12 (Twelve) Hours., Disp: 180 tablet, Rfl: 0    Allergies   Allergen Reactions   • Sudafed [Pseudoephedrine Hcl] Other (See Comments)     Chest pain        reports that she has never smoked. She has never used smokeless tobacco.    Family History   Problem Relation Age of Onset   • Hypertension Father    •  "Heart disease Mother    • Hypertension Sister    • No Known Problems Brother    • No Known Problems Maternal Grandmother    • No Known Problems Maternal Grandfather    • Arthritis Paternal Grandmother    • Hypertension Paternal Grandfather    • No Known Problems Sister    • Breast cancer Neg Hx    • Ovarian cancer Neg Hx    • Colon cancer Neg Hx    • Uterine cancer Neg Hx    • Osteoporosis Neg Hx          Objective:   Blood pressure 140/88, pulse 78, temperature 98 °F (36.7 °C), height 152.4 cm (60\"), weight 72.1 kg (159 lb), last menstrual period 07/01/2017, SpO2 99 %, not currently breastfeeding.  CONSTITUTIONAL: No acute distress, normal affect  RESPIRATORY: Normal effort. Clear to auscultation bilaterally without wheezing or rales.  CARDIOVASCULAR: Regular rate and rhythm with normal S1 and S2. Without murmur, gallop or rub.  No carotid bruit bilaterally.  PERIPHERAL VASCULAR: Normal radial pulse on the right.  There is no peripheral edema bilaterally.    Labs:    Glucose   Date Value Ref Range Status   05/24/2018 101 (H) 70 - 100 mg/dL Final     BUN   Date Value Ref Range Status   05/24/2018 8 (L) 9 - 23 mg/dL Final     Creatinine   Date Value Ref Range Status   05/24/2018 0.60 0.60 - 1.30 mg/dL Final     Sodium   Date Value Ref Range Status   05/24/2018 140 132 - 146 mmol/L Final     Potassium   Date Value Ref Range Status   05/24/2018 3.9 3.5 - 5.5 mmol/L Final     Chloride   Date Value Ref Range Status   05/24/2018 105 99 - 109 mmol/L Final     CO2   Date Value Ref Range Status   05/24/2018 25.0 20.0 - 31.0 mmol/L Final     Calcium   Date Value Ref Range Status   05/24/2018 9.1 8.7 - 10.4 mg/dL Final     Total Protein   Date Value Ref Range Status   05/24/2018 7.5 5.7 - 8.2 g/dL Final     Albumin   Date Value Ref Range Status   05/24/2018 4.40 3.20 - 4.80 g/dL Final     ALT (SGPT)   Date Value Ref Range Status   05/24/2018 92 (H) 7 - 40 U/L Final     AST (SGOT)   Date Value Ref Range Status   05/24/2018 68 " (H) 0 - 33 U/L Final     Alkaline Phosphatase   Date Value Ref Range Status   05/24/2018 81 25 - 100 U/L Final     Total Bilirubin   Date Value Ref Range Status   05/24/2018 0.5 0.3 - 1.2 mg/dL Final     eGFR Non  Amer   Date Value Ref Range Status   05/24/2018 105 >60 mL/min/1.73 Final     BUN/Creatinine Ratio   Date Value Ref Range Status   05/24/2018 13.3 7.0 - 25.0 Final     Anion Gap   Date Value Ref Range Status   05/24/2018 10.0 3.0 - 11.0 mmol/L Final       Lab Results   Component Value Date    CHOL 201 (H) 05/25/2018     Lab Results   Component Value Date    TRIG 115 05/25/2018    TRIG 87 06/27/2014     Lab Results   Component Value Date    HDL 65 (H) 05/25/2018    HDL 48 06/27/2014     No components found for: LDLCALC  No results found for: VLDL  No results found for: LDLHDL    Diagnostic Data:    Procedures    Limited TTE 9/2018  · This was a limited echocardiogram to assess for left ventricular ejection fraction only.  · Left ventricular systolic function is normal. Estimated EF appears to be in the range of 56 - 60%.  · Left ventricular wall thickness is consistent with borderline concentric hypertrophy.    TTE 5/2018  · Left ventricular systolic function is moderately decreased. Estimated EF = 30%.  · The following left ventricular wall segments are hypokinetic: mid anterior, apical anterior, mid anterolateral, apical lateral, mid inferolateral, apical inferior, mid inferior, apical septal, mid inferoseptal and mid anteroseptal.  · The findings are consistent with stress-induced (Takotsubo) cardiomyopathy.  · Left ventricular diastolic dysfunction (grade II) consistent with pseudonormalization.  · Mild mitral valve regurgitation is present    [facility] 5/2018  · The coronary anatomy was normal.  There were no flow limiting, obstructive coronary stenoses.  · Left ventricular ejection fraction of 40% by quantitative analysis.  There is hypokinesis of the mid to distal segments but preservation of apex.   Findings are consistent with an atypical stress-induced cardiomyopathy      Assessment and Plan:   Nicki was seen today for heart problem.    Takotsubo cardiomyopathy  Essential hypertension  -NYHA class I  -Continue metoprolol and Entresto.  -Nitroglycerin sublingual as needed for chest pain.    - Return in about 1 year (around 8/10/2021) for Next scheduled follow up with an ECG, with Katlyn Real.    Serge Valenzuela MD  08/10/20 13:40

## 2021-02-05 ENCOUNTER — OFFICE VISIT (OUTPATIENT)
Dept: OBSTETRICS AND GYNECOLOGY | Facility: CLINIC | Age: 56
End: 2021-02-05

## 2021-02-05 VITALS
SYSTOLIC BLOOD PRESSURE: 122 MMHG | DIASTOLIC BLOOD PRESSURE: 80 MMHG | HEIGHT: 60 IN | WEIGHT: 165.2 LBS | BODY MASS INDEX: 32.43 KG/M2

## 2021-02-05 DIAGNOSIS — Z76.89 ENCOUNTER TO ESTABLISH CARE: ICD-10-CM

## 2021-02-05 DIAGNOSIS — Z01.419 WELL WOMAN EXAM WITH ROUTINE GYNECOLOGICAL EXAM: Primary | ICD-10-CM

## 2021-02-05 DIAGNOSIS — Z12.11 COLON CANCER SCREENING: ICD-10-CM

## 2021-02-05 PROCEDURE — 99396 PREV VISIT EST AGE 40-64: CPT | Performed by: OBSTETRICS & GYNECOLOGY

## 2021-02-05 NOTE — PROGRESS NOTES
"Subjective   Chief Complaint   Patient presents with   • Gynecologic Exam     new pt. last pap 12/15/17 negative , mammo 20 negative     Nicki Dillard is a 56 y.o. year old  menopausal female presenting to be seen for her annual exam.  This past year she has not been on hormone replacement therapy.  There has not been vaginal bleeding in the last 12 months.  Menopausal symptoms are not present.    SEXUAL Hx:  She is currently sexually active.  In the past year there there has been NO new sexual partners.    Condoms are never used.  She would not like to be screened for STD's at today's exam.  Froid is painful: no  HEALTH Hx:  She exercises regularly: yes.  She wears her seat belt: yes.  She has concerns about domestic violence: no.  OTHER THINGS SHE WANTS TO DISCUSS TODAY:  Nothing else    The following portions of the patient's history were reviewed and updated as appropriate:problem list, current medications, allergies, past family history, past medical history, past social history and past surgical history.    Social History    Tobacco Use      Smoking status: Never Smoker      Smokeless tobacco: Never Used      Review of Systems  Constitutional POS: nothing reported    NEG: anorexia or night sweats   Genitourinary POS: nothing reported    NEG: dysuria or hematuria      Gastointestinal POS: nothing reported    NEG: bloating, change in bowel habits, melena or reflux symptoms   Integument POS: nothing reported    NEG: moles that are changing in size, shape, color or rashes   Breast POS: nothing reported    NEG: persistent breast lump, skin dimpling or nipple discharge        Objective   /80   Ht 152.4 cm (60\")   Wt 74.9 kg (165 lb 3.2 oz)   LMP 2017   Breastfeeding No   BMI 32.26 kg/m²     General:  well developed; well nourished  no acute distress   Skin:  No suspicious lesions seen   Thyroid: normal to inspection and palpation   Breasts:  Examined in supine " position  Symmetric without masses or skin dimpling  Nipples normal without inversion, lesions or discharge  There are no palpable axillary nodes   Abdomen: soft, non-tender; no masses  no umbilical or inguinal hernias are present  no hepato-splenomegaly   Pelvis: Clinical staff was present for exam  External genitalia:  normal appearance of the external genitalia including Bartholin's and [place]'s glands.  :  urethral meatus normal;  Vaginal:  normal pink mucosa without prolapse or lesions.  Cervix:  normal appearance.  Uterus:  normal size, shape and consistency.  Adnexa:  normal bimanual exam of the adnexa.  Rectal:  digital rectal exam not performed; anus visually normal appearing.        Assessment   1. Normal GYN exam in menopause  2. She is up to date on all relevant gynecologic and colorectal screenings except colon cancer screening     Plan   Pap and HPV were done today.  If she does not receive the results of the Pap within 2 weeks  time, she was instructed to call to find out the results.  I explained to Nicki that the recommendations for Pap smear interval in a low risk patient's has lengthened to 5 years time if both cytology and HPV testing were normal.  I encouraged her to be seen yearly for a full physical exam including breast and pelvic exam even during the off years when PAP's will not be performed.  She was encouraged to get yearly mammograms.  She should report any palpable breast lump(s) or skin changes regardless of mammographic findings.  I explained to Nicki that notification regarding her mammogram results will come from the center performing the study.  Our office will not be routinely calling with mammogram results.  It is her responsibility to make sure that the results from the mammogram are communicated to her by the breast center.  If she has any questions about the results, she is welcome to call our office anytime.  Colonoscopy was recommended for screening for colon  cancer.  The procedure was briefly discussed and its benefits for early detection of colon cancer were emphasized.  I explained to Nicki that we could help her to schedule it if she wishes.  Additionally, she could also contact her primary care physician to help make this arrangement.  After considering these options she wants help setting up her colonoscopy.  Referral will be made to Dr. Abdi for outpatient colonoscopy.  Amb referral to family medicine to establish are.   The importance of keeping all planned follow-up and taking all medications as prescribed was emphasized.  Follow up for annual exam in one year    No orders of the defined types were placed in this encounter.         This note was electronically signed.    Bernadette Padilla MD  February 5, 2021    Note: Speech recognition transcription software may have been used to create portions of this document.  An attempt at proofreading has been made but errors in transcription could still be present.

## 2021-03-24 ENCOUNTER — IMMUNIZATION (OUTPATIENT)
Dept: VACCINE CLINIC | Facility: HOSPITAL | Age: 56
End: 2021-03-24

## 2021-03-24 PROCEDURE — 0001A: CPT | Performed by: INTERNAL MEDICINE

## 2021-03-24 PROCEDURE — 91300 HC SARSCOV02 VAC 30MCG/0.3ML IM: CPT | Performed by: INTERNAL MEDICINE

## 2021-03-30 ENCOUNTER — OFFICE VISIT (OUTPATIENT)
Dept: OBSTETRICS AND GYNECOLOGY | Facility: CLINIC | Age: 56
End: 2021-03-30

## 2021-03-30 VITALS
HEIGHT: 60 IN | WEIGHT: 165.6 LBS | DIASTOLIC BLOOD PRESSURE: 90 MMHG | SYSTOLIC BLOOD PRESSURE: 120 MMHG | BODY MASS INDEX: 32.51 KG/M2

## 2021-03-30 DIAGNOSIS — R87.615 UNSATISFACTORY CERVICAL CYTOLOGY SMEAR: Primary | ICD-10-CM

## 2021-03-30 NOTE — PROGRESS NOTES
"Subjective   Chief Complaint   Patient presents with   • Follow-up     repeat pap     Nicki Dillard is a 56 y.o. year old .  Patient's last menstrual period was 2017.  She presents to be seen because of non diagnostic pap smear. No complaints today.     OTHER THINGS SHE WANTS TO DISCUSS TODAY:  Nothing else    The following portions of the patient's history were reviewed and updated as appropriate:current medications and allergies    Social History    Tobacco Use      Smoking status: Never Smoker      Smokeless tobacco: Never Used    Review of Systems  Constitutional POS: nothing reported    NEG: anorexia or night sweats   Genitourinary POS: nothing reported    NEG: dysuria or hematuria   Gastointestinal POS: nothing reported    NEG: bloating, change in bowel habits, melena or reflux symptoms   Integument POS: nothing reported    NEG: moles that are changing in size, shape, color or rashes   Breast POS: nothing reported    NEG: persistent breast lump, skin dimpling or nipple discharge         Objective   /90 (BP Location: Right arm, Patient Position: Sitting, Cuff Size: Adult)   Ht 152.4 cm (60\")   Wt 75.1 kg (165 lb 9.6 oz)   LMP 2017   Breastfeeding No   BMI 32.34 kg/m²     General:  well developed; well nourished  no acute distress   Skin:  Not performed.   Thyroid: not examined   Lungs:  Unlabored breathing   Heart:  Not performed.   Breasts:  Not performed.   Abdomen: soft, non-tender; no masses  no umbilical or inguinal hernias are present  no hepato-splenomegaly   Pelvis: Clinical staff was present for exam  External genitalia:  normal appearance of the external genitalia including Bartholin's and [employer]'s glands.  :  urethral meatus normal;  Vaginal:  normal pink mucosa without prolapse or lesions.  Cervix:  normal appearance.     Lab Review   PAP    Imaging   No data reviewed        Assessment   1. Non diagnostic pap smear      Plan   Pap and HPV were done today.  " If she does not receive the results of the Pap within 2 weeks  time, she was instructed to call to find out the results.  I explained to Nicki that the recommendations for Pap smear interval in a low risk patient's has lengthened to 5 years time if both cytology and HPV testing were normal.  I encouraged her to be seen yearly for a full physical exam including breast and pelvic exam even during the off years when PAP's will not be performed.  The importance of keeping all planned follow-up and taking all medications as prescribed was emphasized.  Follow up for annual exam in one year    No orders of the defined types were placed in this encounter.         This note was electronically signed.    Bernadette Padilla MD  March 30, 2021    Note: Speech recognition transcription software may have been used to create portions of this document.  An attempt at proofreading has been made but errors in transcription could still be present.

## 2021-04-14 ENCOUNTER — IMMUNIZATION (OUTPATIENT)
Dept: VACCINE CLINIC | Facility: HOSPITAL | Age: 56
End: 2021-04-14

## 2021-04-14 PROCEDURE — 0002A: CPT | Performed by: INTERNAL MEDICINE

## 2021-04-14 PROCEDURE — 91300 HC SARSCOV02 VAC 30MCG/0.3ML IM: CPT | Performed by: INTERNAL MEDICINE

## 2021-05-17 ENCOUNTER — OFFICE VISIT (OUTPATIENT)
Dept: INTERNAL MEDICINE | Facility: CLINIC | Age: 56
End: 2021-05-17

## 2021-05-17 VITALS
RESPIRATION RATE: 15 BRPM | OXYGEN SATURATION: 98 % | BODY MASS INDEX: 33.02 KG/M2 | TEMPERATURE: 98 F | SYSTOLIC BLOOD PRESSURE: 110 MMHG | HEART RATE: 69 BPM | DIASTOLIC BLOOD PRESSURE: 72 MMHG | HEIGHT: 60 IN | WEIGHT: 168.2 LBS

## 2021-05-17 DIAGNOSIS — Z11.59 ENCOUNTER FOR HEPATITIS C SCREENING TEST FOR LOW RISK PATIENT: ICD-10-CM

## 2021-05-17 DIAGNOSIS — Z87.19 HISTORY OF DIVERTICULITIS: ICD-10-CM

## 2021-05-17 DIAGNOSIS — Z13.220 SCREENING FOR LIPID DISORDERS: ICD-10-CM

## 2021-05-17 DIAGNOSIS — Z00.00 ENCOUNTER FOR HEALTH MAINTENANCE EXAMINATION: Primary | ICD-10-CM

## 2021-05-17 DIAGNOSIS — I51.81 TAKOTSUBO CARDIOMYOPATHY: ICD-10-CM

## 2021-05-17 DIAGNOSIS — I10 ESSENTIAL HYPERTENSION: ICD-10-CM

## 2021-05-17 DIAGNOSIS — Z12.11 SCREEN FOR COLON CANCER: ICD-10-CM

## 2021-05-17 LAB
ALBUMIN SERPL-MCNC: 3.9 G/DL (ref 3.5–5.2)
ALBUMIN/GLOB SERPL: 1.5 G/DL
ALP SERPL-CCNC: 70 U/L (ref 39–117)
ALT SERPL W P-5'-P-CCNC: 15 U/L (ref 1–33)
ANION GAP SERPL CALCULATED.3IONS-SCNC: 9.4 MMOL/L (ref 5–15)
AST SERPL-CCNC: 17 U/L (ref 1–32)
BASOPHILS # BLD AUTO: 0.04 10*3/MM3 (ref 0–0.2)
BASOPHILS NFR BLD AUTO: 0.7 % (ref 0–1.5)
BILIRUB SERPL-MCNC: 0.3 MG/DL (ref 0–1.2)
BUN SERPL-MCNC: 11 MG/DL (ref 6–20)
BUN/CREAT SERPL: 20.4 (ref 7–25)
CALCIUM SPEC-SCNC: 8.8 MG/DL (ref 8.6–10.5)
CHLORIDE SERPL-SCNC: 105 MMOL/L (ref 98–107)
CHOLEST SERPL-MCNC: 165 MG/DL (ref 0–200)
CO2 SERPL-SCNC: 25.6 MMOL/L (ref 22–29)
CREAT SERPL-MCNC: 0.54 MG/DL (ref 0.57–1)
DEPRECATED RDW RBC AUTO: 36.3 FL (ref 37–54)
EOSINOPHIL # BLD AUTO: 0.1 10*3/MM3 (ref 0–0.4)
EOSINOPHIL NFR BLD AUTO: 1.7 % (ref 0.3–6.2)
ERYTHROCYTE [DISTWIDTH] IN BLOOD BY AUTOMATED COUNT: 11.6 % (ref 12.3–15.4)
GFR SERPL CREATININE-BSD FRML MDRD: 117 ML/MIN/1.73
GLOBULIN UR ELPH-MCNC: 2.6 GM/DL
GLUCOSE SERPL-MCNC: 84 MG/DL (ref 65–99)
HCT VFR BLD AUTO: 36.5 % (ref 34–46.6)
HCV AB SER DONR QL: NORMAL
HDLC SERPL-MCNC: 62 MG/DL (ref 40–60)
HGB BLD-MCNC: 12.2 G/DL (ref 12–15.9)
IMM GRANULOCYTES # BLD AUTO: 0.02 10*3/MM3 (ref 0–0.05)
IMM GRANULOCYTES NFR BLD AUTO: 0.3 % (ref 0–0.5)
LDLC SERPL CALC-MCNC: 90 MG/DL (ref 0–100)
LDLC/HDLC SERPL: 1.44 {RATIO}
LYMPHOCYTES # BLD AUTO: 2.41 10*3/MM3 (ref 0.7–3.1)
LYMPHOCYTES NFR BLD AUTO: 41.3 % (ref 19.6–45.3)
MCH RBC QN AUTO: 29 PG (ref 26.6–33)
MCHC RBC AUTO-ENTMCNC: 33.4 G/DL (ref 31.5–35.7)
MCV RBC AUTO: 86.9 FL (ref 79–97)
MONOCYTES # BLD AUTO: 0.46 10*3/MM3 (ref 0.1–0.9)
MONOCYTES NFR BLD AUTO: 7.9 % (ref 5–12)
NEUTROPHILS NFR BLD AUTO: 2.81 10*3/MM3 (ref 1.7–7)
NEUTROPHILS NFR BLD AUTO: 48.1 % (ref 42.7–76)
NRBC BLD AUTO-RTO: 0 /100 WBC (ref 0–0.2)
PLATELET # BLD AUTO: 284 10*3/MM3 (ref 140–450)
PMV BLD AUTO: 10.7 FL (ref 6–12)
POTASSIUM SERPL-SCNC: 3.7 MMOL/L (ref 3.5–5.2)
PROT SERPL-MCNC: 6.5 G/DL (ref 6–8.5)
RBC # BLD AUTO: 4.2 10*6/MM3 (ref 3.77–5.28)
SODIUM SERPL-SCNC: 140 MMOL/L (ref 136–145)
TRIGL SERPL-MCNC: 68 MG/DL (ref 0–150)
TSH SERPL DL<=0.05 MIU/L-ACNC: 2.11 UIU/ML (ref 0.27–4.2)
VLDLC SERPL-MCNC: 13 MG/DL (ref 5–40)
WBC # BLD AUTO: 5.84 10*3/MM3 (ref 3.4–10.8)

## 2021-05-17 PROCEDURE — 85025 COMPLETE CBC W/AUTO DIFF WBC: CPT | Performed by: PHYSICIAN ASSISTANT

## 2021-05-17 PROCEDURE — 84443 ASSAY THYROID STIM HORMONE: CPT | Performed by: PHYSICIAN ASSISTANT

## 2021-05-17 PROCEDURE — 86803 HEPATITIS C AB TEST: CPT | Performed by: PHYSICIAN ASSISTANT

## 2021-05-17 PROCEDURE — 80061 LIPID PANEL: CPT | Performed by: PHYSICIAN ASSISTANT

## 2021-05-17 PROCEDURE — 99386 PREV VISIT NEW AGE 40-64: CPT | Performed by: PHYSICIAN ASSISTANT

## 2021-05-17 PROCEDURE — 80053 COMPREHEN METABOLIC PANEL: CPT | Performed by: PHYSICIAN ASSISTANT

## 2021-05-17 PROCEDURE — 36415 COLL VENOUS BLD VENIPUNCTURE: CPT | Performed by: PHYSICIAN ASSISTANT

## 2021-05-17 RX ORDER — NITROGLYCERIN 0.4 MG/1
0.4 TABLET SUBLINGUAL
Qty: 25 TABLET | Refills: 0 | Status: SHIPPED | OUTPATIENT
Start: 2021-05-17

## 2021-05-17 NOTE — PROGRESS NOTES
"Chief Complaint   Patient presents with   • Establish Care     Previous Randy pt, w/o pap   • Annual Exam     Fasting       Subjective       History of Present Illness     Nicki Dillard is a 56 y.o. female. She presents as a new patient to establish care, and for her annual physical. Her only concern today is occasional diverticulitis flares. She reports she had a flare up this weekend, from Thurs-Saturday with all sx resolved by last night. Had LLQ pain which is now resolved. She controls this with diet changes at this time. She has never had a colonoscopy before.     She does have Takotsubo cardiomyopathy and HTN, followed by Dr. Valenzuela in cardiology. Sx are currently well-controlled. Had major episodes around 2014 and again 2018, and now has \"few minutes\" of episodes with chest tightness but these pass very quickly. She is taking metoprolol and Entresto as directed.       Are you currently seeing any other doctors or specialists? Dr. Padilla-- OBGYN; Dr. Valenzuela-- cardiology   Are you currently taking any OTC medications or herbal medications? Multivitamin     Most recent colonoscopy: never had  Most recent mammogram: 6/20/2020  Most recent pap smear: 3/30/2021  First day of last menses: n/a, menopausal x3 years     Regular dental visits: Yes, UTD  Regular eye exams: Yes, UTD, wears glasses and contacts     PHQ-2 Depression Screening  Little interest or pleasure in doing things? 0   Feeling down, depressed, or hopeless? 0   PHQ-2 Total Score 0         The following portions of the patient's history were reviewed and updated as appropriate: allergies, current medications, past family history, past medical history, past social history, past surgical history and problem list.    Allergies   Allergen Reactions   • Sudafed [Pseudoephedrine Hcl] Other (See Comments)     Chest pain     Social History     Tobacco Use   • Smoking status: Never Smoker   • Smokeless tobacco: Never Used   Substance Use Topics   • " Alcohol use: Not Currently     Past Surgical History:   Procedure Laterality Date   • ADENOIDECTOMY     • CARDIAC CATHETERIZATION N/A 2018    Procedure: Left Heart Cath;  Surgeon: Serge Valenzuela MD;  Location: [facility] INVASIVE LOCATION;  Service: Cardiovascular   •  SECTION     • HAND SURGERY Right    • HERNIA REPAIR     • TONSILLECTOMY     • TUBAL ABDOMINAL LIGATION       Family History   Problem Relation Age of Onset   • Hypertension Father    • Heart disease Mother    • Hypertension Sister    • No Known Problems Brother    • No Known Problems Maternal Grandmother    • No Known Problems Maternal Grandfather    • Arthritis Paternal Grandmother    • Hypertension Paternal Grandfather    • No Known Problems Sister    • Breast cancer Neg Hx    • Ovarian cancer Neg Hx    • Colon cancer Neg Hx    • Uterine cancer Neg Hx    • Osteoporosis Neg Hx          Current Outpatient Medications:   •  metoprolol tartrate (LOPRESSOR) 25 MG tablet, Take 0.5 tablets by mouth Every 12 (Twelve) Hours., Disp: 90 tablet, Rfl: 3  •  Multiple Vitamin (MULTI-VITAMIN DAILY PO), Take 1 tablet by mouth Daily., Disp: , Rfl:   •  sacubitril-valsartan (Entresto) 24-26 MG tablet, Take 1 tablet by mouth Every 12 (Twelve) Hours., Disp: 180 tablet, Rfl: 3  •  nitroglycerin (NITROSTAT) 0.4 MG SL tablet, Place 1 tablet under the tongue Every 5 (Five) Minutes As Needed for Chest Pain. Take no more than 3 doses in 15 minutes., Disp: 25 tablet, Rfl: 0    Patient Active Problem List   Diagnosis   • Menorrhagia   • Chest pain   • Hypertension   • Takotsubo cardiomyopathy   • Well woman exam       Review of Systems   Constitutional: Negative for chills, fatigue and fever.   HENT: Negative for congestion, ear pain, sore throat and trouble swallowing.    Eyes: Negative for pain and visual disturbance.   Respiratory: Positive for chest tightness (episodic, with cardiomyopathy). Negative for cough, shortness of breath and wheezing.     Cardiovascular: Negative for chest pain and palpitations.   Gastrointestinal: Negative for abdominal pain, diarrhea, nausea and vomiting.   Endocrine: Negative for cold intolerance and heat intolerance.   Genitourinary: Negative for breast lump, breast pain, dysuria and hematuria.   Musculoskeletal: Negative for arthralgias and neck pain.   Skin: Negative for rash.   Allergic/Immunologic: Negative for immunocompromised state.   Neurological: Negative for dizziness, syncope, weakness and headache.   Psychiatric/Behavioral: Negative for sleep disturbance and depressed mood. The patient is not nervous/anxious.        Objective   Vitals:    05/17/21 1409   BP: 110/72   Pulse: 69   Resp: 15   Temp: 98 °F (36.7 °C)   SpO2: 98%     Physical Exam  Vitals reviewed.   Constitutional:       Appearance: She is well-developed.   HENT:      Head: Normocephalic and atraumatic.      Right Ear: Tympanic membrane, ear canal and external ear normal. No tenderness.      Left Ear: Tympanic membrane, ear canal and external ear normal. No tenderness.      Nose: Nose normal.      Mouth/Throat:      Mouth: Mucous membranes are moist. No oral lesions.      Pharynx: Oropharynx is clear. Uvula midline.   Eyes:      General: No scleral icterus.     Conjunctiva/sclera: Conjunctivae normal.      Pupils: Pupils are equal, round, and reactive to light.   Neck:      Thyroid: No thyroid mass or thyromegaly.      Vascular: No carotid bruit.      Trachea: Trachea normal.   Cardiovascular:      Rate and Rhythm: Normal rate and regular rhythm.      Pulses: Normal pulses.           Radial pulses are 2+ on the right side and 2+ on the left side.        Dorsalis pedis pulses are 2+ on the right side and 2+ on the left side.      Heart sounds: Normal heart sounds. No murmur heard.   No gallop.    Pulmonary:      Effort: Pulmonary effort is normal.      Breath sounds: Normal breath sounds. No wheezing or rales.   Chest:      Comments: Deferred-- follows  with GYN.   Abdominal:      General: Bowel sounds are normal. There is no distension.      Palpations: Abdomen is soft. There is no mass.      Tenderness: There is no abdominal tenderness.   Musculoskeletal:         General: No deformity. Normal range of motion.      Cervical back: Normal range of motion and neck supple.   Lymphadenopathy:      Cervical: No cervical adenopathy.   Skin:     General: Skin is warm and dry.      Findings: No rash.      Comments: No atypical nevi.    Neurological:      Mental Status: She is alert and oriented to person, place, and time.   Psychiatric:         Behavior: Behavior normal.               Assessment/Plan   Diagnoses and all orders for this visit:    1. Encounter for health maintenance examination (Primary)  -     Ambulatory Referral For Screening Colonoscopy  -     CBC & Differential  -     Comprehensive Metabolic Panel  -     Hepatitis C Antibody  -     Lipid Panel  -     TSH    2. History of diverticulitis  -     CBC & Differential  -     Comprehensive Metabolic Panel    3. Screen for colon cancer  -     Ambulatory Referral For Screening Colonoscopy    4. Takotsubo cardiomyopathy  -     nitroglycerin (NITROSTAT) 0.4 MG SL tablet; Place 1 tablet under the tongue Every 5 (Five) Minutes As Needed for Chest Pain. Take no more than 3 doses in 15 minutes.  Dispense: 25 tablet; Refill: 0  -     TSH    5. Essential hypertension  -     CBC & Differential  -     Comprehensive Metabolic Panel  -     TSH    6. Screening for lipid disorders  -     Lipid Panel    7. Encounter for hepatitis C screening test for low risk patient  -     Hepatitis C Antibody      Continue all routine medications and routine f/u with cardiology.   Further plans after review of labs.          Patient education discussed during this visit:  - avoidance of texting while driving and the need for wearing seatbelt  - use of sunscreen  - healthy sleep habits and appropriate amount of sleep  - H2O consumption,  well-balanced diet  - exercise routine which includes at least 150 minutes of cardio per week + muscle strengthening exercises  - immunizations including annual flu vaccination      Return in about 1 year (around 5/17/2022) for Annual physical.

## 2021-06-01 ENCOUNTER — TELEPHONE (OUTPATIENT)
Dept: INTERNAL MEDICINE | Facility: CLINIC | Age: 56
End: 2021-06-01

## 2021-06-01 NOTE — TELEPHONE ENCOUNTER
Caller: Nicki Dillard    Relationship: Self    Best call back number: [phone]    What is the best time to reach you: ANYTIME     Who are you requesting to speak with (clinical staff, provider,  specific staff member):  CLINICAL STAFF     What was the call regarding: PATIENT STATES THAT SHE HAD SOME LABS DONE AND SAW THE RESULTS IN HER MYCHART. SHE NOTICED THAT SOME OF THE RESULTS WERE OUT OF THE NORMAL RANGE BUT SHE HAS NOT RECEIVED A CALL TO DISCUSS THEM. PATIENT IS WANTING TO TALK TO SOMEONE ABOUT THE RESULTS AND NEXT STEPS.     Do you require a callback: YES

## 2021-06-01 NOTE — TELEPHONE ENCOUNTER
Patient states she never received a letter in the mail with her results. I went over results with patient and read the letter associated with the labs. Patient verbalized understanding and will call back with concerns

## 2021-07-16 ENCOUNTER — HOSPITAL ENCOUNTER (EMERGENCY)
Facility: HOSPITAL | Age: 56
Discharge: HOME | End: 2021-07-16
Attending: EMERGENCY MEDICINE | Admitting: EMERGENCY MEDICINE

## 2021-07-16 ENCOUNTER — APPOINTMENT (OUTPATIENT)
Dept: CT IMAGING | Facility: HOSPITAL | Age: 56
End: 2021-07-16

## 2021-07-16 VITALS
RESPIRATION RATE: 16 BRPM | WEIGHT: 163 LBS | DIASTOLIC BLOOD PRESSURE: 63 MMHG | HEART RATE: 79 BPM | HEIGHT: 60 IN | BODY MASS INDEX: 32 KG/M2 | TEMPERATURE: 98 F | OXYGEN SATURATION: 95 % | SYSTOLIC BLOOD PRESSURE: 101 MMHG

## 2021-07-16 DIAGNOSIS — K57.92 DIVERTICULITIS: ICD-10-CM

## 2021-07-16 DIAGNOSIS — R10.11 RIGHT UPPER QUADRANT ABDOMINAL PAIN: Primary | ICD-10-CM

## 2021-07-16 LAB
ALBUMIN SERPL-MCNC: 4 G/DL (ref 3.5–5.2)
ALBUMIN/GLOB SERPL: 1.3 G/DL
ALP SERPL-CCNC: 79 U/L (ref 39–117)
ALT SERPL W P-5'-P-CCNC: 8 U/L (ref 1–33)
ANION GAP SERPL CALCULATED.3IONS-SCNC: 8 MMOL/L (ref 5–15)
AST SERPL-CCNC: 13 U/L (ref 1–32)
BASOPHILS # BLD AUTO: 0.03 10*3/MM3 (ref 0–0.2)
BASOPHILS NFR BLD AUTO: 0.4 % (ref 0–1.5)
BILIRUB SERPL-MCNC: 0.3 MG/DL (ref 0–1.2)
BILIRUB UR QL STRIP: NEGATIVE
BUN SERPL-MCNC: 9 MG/DL (ref 6–20)
BUN/CREAT SERPL: 13.6 (ref 7–25)
CALCIUM SPEC-SCNC: 9.3 MG/DL (ref 8.6–10.5)
CHLORIDE SERPL-SCNC: 104 MMOL/L (ref 98–107)
CLARITY UR: CLEAR
CO2 SERPL-SCNC: 26 MMOL/L (ref 22–29)
COLOR UR: YELLOW
CREAT SERPL-MCNC: 0.66 MG/DL (ref 0.57–1)
D-LACTATE SERPL-SCNC: 0.7 MMOL/L (ref 0.5–2)
DEPRECATED RDW RBC AUTO: 39.7 FL (ref 37–54)
EOSINOPHIL # BLD AUTO: 0.08 10*3/MM3 (ref 0–0.4)
EOSINOPHIL NFR BLD AUTO: 1.1 % (ref 0.3–6.2)
ERYTHROCYTE [DISTWIDTH] IN BLOOD BY AUTOMATED COUNT: 12.1 % (ref 12.3–15.4)
GFR SERPL CREATININE-BSD FRML MDRD: 93 ML/MIN/1.73
GLOBULIN UR ELPH-MCNC: 3 GM/DL
GLUCOSE SERPL-MCNC: 106 MG/DL (ref 65–99)
GLUCOSE UR STRIP-MCNC: NEGATIVE MG/DL
HCT VFR BLD AUTO: 40.5 % (ref 34–46.6)
HGB BLD-MCNC: 12.9 G/DL (ref 12–15.9)
HGB UR QL STRIP.AUTO: NEGATIVE
HOLD SPECIMEN: NORMAL
IMM GRANULOCYTES # BLD AUTO: 0.02 10*3/MM3 (ref 0–0.05)
IMM GRANULOCYTES NFR BLD AUTO: 0.3 % (ref 0–0.5)
KETONES UR QL STRIP: NEGATIVE
LEUKOCYTE ESTERASE UR QL STRIP.AUTO: NEGATIVE
LIPASE SERPL-CCNC: 34 U/L (ref 13–60)
LYMPHOCYTES # BLD AUTO: 2.31 10*3/MM3 (ref 0.7–3.1)
LYMPHOCYTES NFR BLD AUTO: 33 % (ref 19.6–45.3)
MCH RBC QN AUTO: 28.5 PG (ref 26.6–33)
MCHC RBC AUTO-ENTMCNC: 31.9 G/DL (ref 31.5–35.7)
MCV RBC AUTO: 89.4 FL (ref 79–97)
MONOCYTES # BLD AUTO: 0.55 10*3/MM3 (ref 0.1–0.9)
MONOCYTES NFR BLD AUTO: 7.9 % (ref 5–12)
NEUTROPHILS NFR BLD AUTO: 4 10*3/MM3 (ref 1.7–7)
NEUTROPHILS NFR BLD AUTO: 57.3 % (ref 42.7–76)
NITRITE UR QL STRIP: NEGATIVE
NRBC BLD AUTO-RTO: 0 /100 WBC (ref 0–0.2)
PH UR STRIP.AUTO: 6.5 [PH] (ref 5–8)
PLATELET # BLD AUTO: 285 10*3/MM3 (ref 140–450)
PMV BLD AUTO: 9.4 FL (ref 6–12)
POTASSIUM SERPL-SCNC: 4.2 MMOL/L (ref 3.5–5.2)
PROT SERPL-MCNC: 7 G/DL (ref 6–8.5)
PROT UR QL STRIP: NEGATIVE
RBC # BLD AUTO: 4.53 10*6/MM3 (ref 3.77–5.28)
SODIUM SERPL-SCNC: 138 MMOL/L (ref 136–145)
SP GR UR STRIP: 1.01 (ref 1–1.03)
UROBILINOGEN UR QL STRIP: NORMAL
WBC # BLD AUTO: 6.99 10*3/MM3 (ref 3.4–10.8)
WHOLE BLOOD HOLD SPECIMEN: NORMAL

## 2021-07-16 PROCEDURE — 96376 TX/PRO/DX INJ SAME DRUG ADON: CPT

## 2021-07-16 PROCEDURE — 25010000002 ONDANSETRON PER 1 MG: Performed by: EMERGENCY MEDICINE

## 2021-07-16 PROCEDURE — 74177 CT ABD & PELVIS W/CONTRAST: CPT

## 2021-07-16 PROCEDURE — 83690 ASSAY OF LIPASE: CPT

## 2021-07-16 PROCEDURE — 80053 COMPREHEN METABOLIC PANEL: CPT

## 2021-07-16 PROCEDURE — 96375 TX/PRO/DX INJ NEW DRUG ADDON: CPT

## 2021-07-16 PROCEDURE — 83605 ASSAY OF LACTIC ACID: CPT

## 2021-07-16 PROCEDURE — 81003 URINALYSIS AUTO W/O SCOPE: CPT

## 2021-07-16 PROCEDURE — 99283 EMERGENCY DEPT VISIT LOW MDM: CPT

## 2021-07-16 PROCEDURE — 85025 COMPLETE CBC W/AUTO DIFF WBC: CPT

## 2021-07-16 PROCEDURE — 25010000002 IOPAMIDOL 61 % SOLUTION: Performed by: EMERGENCY MEDICINE

## 2021-07-16 PROCEDURE — 96374 THER/PROPH/DIAG INJ IV PUSH: CPT

## 2021-07-16 PROCEDURE — 25010000002 HYDROMORPHONE 1 MG/ML SOLUTION: Performed by: EMERGENCY MEDICINE

## 2021-07-16 RX ORDER — CIPROFLOXACIN 500 MG/1
500 TABLET, FILM COATED ORAL 2 TIMES DAILY
Qty: 20 TABLET | Refills: 0 | Status: SHIPPED | OUTPATIENT
Start: 2021-07-16 | End: 2021-08-16

## 2021-07-16 RX ORDER — METRONIDAZOLE 500 MG/1
500 TABLET ORAL 3 TIMES DAILY
Qty: 30 TABLET | Refills: 0 | Status: SHIPPED | OUTPATIENT
Start: 2021-07-16 | End: 2021-08-16

## 2021-07-16 RX ORDER — SODIUM CHLORIDE 9 MG/ML
10 INJECTION INTRAVENOUS AS NEEDED
Status: DISCONTINUED | OUTPATIENT
Start: 2021-07-16 | End: 2021-07-16 | Stop reason: HOSPADM

## 2021-07-16 RX ORDER — SODIUM CHLORIDE 0.9 % (FLUSH) 0.9 %
10 SYRINGE (ML) INJECTION AS NEEDED
Status: DISCONTINUED | OUTPATIENT
Start: 2021-07-16 | End: 2021-07-16 | Stop reason: HOSPADM

## 2021-07-16 RX ORDER — METRONIDAZOLE 500 MG/1
500 TABLET ORAL ONCE
Status: COMPLETED | OUTPATIENT
Start: 2021-07-16 | End: 2021-07-16

## 2021-07-16 RX ORDER — LEVOFLOXACIN 750 MG/1
750 TABLET ORAL ONCE
Status: COMPLETED | OUTPATIENT
Start: 2021-07-16 | End: 2021-07-16

## 2021-07-16 RX ORDER — ONDANSETRON 2 MG/ML
4 INJECTION INTRAMUSCULAR; INTRAVENOUS ONCE
Status: COMPLETED | OUTPATIENT
Start: 2021-07-16 | End: 2021-07-16

## 2021-07-16 RX ADMIN — HYDROMORPHONE HYDROCHLORIDE 1 MG: 1 INJECTION, SOLUTION INTRAMUSCULAR; INTRAVENOUS; SUBCUTANEOUS at 12:44

## 2021-07-16 RX ADMIN — ONDANSETRON 4 MG: 2 INJECTION INTRAMUSCULAR; INTRAVENOUS at 12:44

## 2021-07-16 RX ADMIN — SODIUM CHLORIDE 1000 ML: 9 INJECTION, SOLUTION INTRAVENOUS at 12:43

## 2021-07-16 RX ADMIN — METRONIDAZOLE 500 MG: 500 TABLET ORAL at 15:04

## 2021-07-16 RX ADMIN — HYDROMORPHONE HYDROCHLORIDE 1 MG: 1 INJECTION, SOLUTION INTRAMUSCULAR; INTRAVENOUS; SUBCUTANEOUS at 15:04

## 2021-07-16 RX ADMIN — LEVOFLOXACIN 750 MG: 750 TABLET, FILM COATED ORAL at 15:03

## 2021-07-16 RX ADMIN — IOPAMIDOL 100 ML: 612 INJECTION, SOLUTION INTRAVENOUS at 13:09

## 2021-07-16 NOTE — ED PROVIDER NOTES
Subjective   Nicki Dillard is a 56 yr old male that presents the emergency department complaints of abdominal pain.  Patient reports that her pain is on the right side of her abdomen.  She explains that the pain woke her up around 4 AM.  She describes it as a sharp stabbing pain.  She denies any nausea, vomiting or diarrhea.  Pain is currently a 9 out of 10.  Negative for any urinary frequency, burning, urgency.  She advises she has been battling diverticulitis over the past week but she has not been taking any antibiotics.  She reports a history of diverticulitis however she does verbalize that it is typically in her left side.  Patient has a history of hypertension, hernia repair and 3 C-sections.  Patient explains she did eat stirfry last night.      History provided by:  Patient   used: No    Abdominal Pain  Pain location:  RUQ  Pain quality: sharp and stabbing    Pain radiates to:  Does not radiate  Pain severity:  Moderate  Duration: 4 am.  Timing:  Constant  Progression:  Worsening  Relieved by:  Nothing  Worsened by:  Nothing  Associated symptoms: no chest pain, no chills, no cough, no diarrhea, no dysuria, no fever, no nausea, no shortness of breath and no vomiting        Review of Systems   Constitutional: Negative for chills and fever.   Respiratory: Negative for cough and shortness of breath.    Cardiovascular: Negative for chest pain.   Gastrointestinal: Positive for abdominal pain. Negative for diarrhea, nausea and vomiting.   Genitourinary: Negative for dysuria, flank pain, frequency and urgency.   Musculoskeletal: Negative for back pain.   Neurological: Negative for dizziness and weakness.   All other systems reviewed and are negative.      Past Medical History:   Diagnosis Date   • Arthritis    • Diverticulitis 2019   • Hypertension    • Takotsubo cardiomyopathy    • Urinary leakage        Allergies   Allergen Reactions   • Sudafed [Pseudoephedrine Hcl] Other (See  Comments)     Chest pain       Past Surgical History:   Procedure Laterality Date   • ADENOIDECTOMY     • CARDIAC CATHETERIZATION N/A 2018    Procedure: Left Heart Cath;  Surgeon: Serge Valenzuela MD;  Location: [facility] CATH INVASIVE LOCATION;  Service: Cardiovascular   •  SECTION     • HAND SURGERY Right    • HERNIA REPAIR     • TONSILLECTOMY     • TUBAL ABDOMINAL LIGATION         Family History   Problem Relation Age of Onset   • Hypertension Father    • Heart disease Mother    • Hypertension Sister    • No Known Problems Brother    • No Known Problems Maternal Grandmother    • No Known Problems Maternal Grandfather    • Arthritis Paternal Grandmother    • Hypertension Paternal Grandfather    • No Known Problems Sister    • Breast cancer Neg Hx    • Ovarian cancer Neg Hx    • Colon cancer Neg Hx    • Uterine cancer Neg Hx    • Osteoporosis Neg Hx        Social History     Socioeconomic History   • Marital status:      Spouse name: Not on file   • Number of children: Not on file   • Years of education: Not on file   • Highest education level: Not on file   Tobacco Use   • Smoking status: Never Smoker   • Smokeless tobacco: Never Used   Substance and Sexual Activity   • Alcohol use: Not Currently   • Drug use: No   • Sexual activity: Yes     Partners: Male     Birth control/protection: Post-menopausal           Objective   Physical Exam  Vitals and nursing note reviewed.   Constitutional:       General: She is in acute distress.      Appearance: Normal appearance. She is well-developed. She is ill-appearing. She is not toxic-appearing.   HENT:      Head: Normocephalic and atraumatic.      Mouth/Throat:      Mouth: Mucous membranes are moist.   Eyes:      General: Lids are normal.      Extraocular Movements: Extraocular movements intact.      Conjunctiva/sclera: Conjunctivae normal.      Pupils: Pupils are equal, round, and reactive to light.   Neck:      Trachea: Trachea normal.   Cardiovascular:       Rate and Rhythm: Normal rate and regular rhythm.      Pulses: Normal pulses.      Heart sounds: Normal heart sounds.   Pulmonary:      Effort: Pulmonary effort is normal. No respiratory distress.      Breath sounds: Normal breath sounds. No decreased breath sounds, wheezing, rhonchi or rales.   Abdominal:      General: Bowel sounds are normal.      Palpations: Abdomen is soft.      Tenderness: There is abdominal tenderness in the right upper quadrant.   Musculoskeletal:         General: Normal range of motion.      Cervical back: Full passive range of motion without pain and normal range of motion.   Skin:     General: Skin is warm and dry.      Findings: No rash.   Neurological:      Mental Status: She is alert and oriented to person, place, and time.      Cranial Nerves: No cranial nerve deficit.   Psychiatric:         Speech: Speech normal.         Behavior: Behavior normal. Behavior is cooperative.         Procedures           ED Course  ED Course as of Jul 16 1509   Fri Jul 16, 2021   1425 Results are discussed with patient at this time.  Patient will be started on Cipro and Flagyl.  Patient to follow-up with GI.  Patient explains she is scheduled for colonoscopy in August.  Patient to return to the ER as needed.  Patient agrees with treatment plan and verbalized understanding.    [KG]      ED Course User Index  [KG] Denise Madrid, ESTRADA           Recent Results (from the past 24 hour(s))   Comprehensive Metabolic Panel    Collection Time: 07/16/21 10:58 AM    Specimen: Blood   Result Value Ref Range    Glucose 106 (H) 65 - 99 mg/dL    BUN 9 6 - 20 mg/dL    Creatinine 0.66 0.57 - 1.00 mg/dL    Sodium 138 136 - 145 mmol/L    Potassium 4.2 3.5 - 5.2 mmol/L    Chloride 104 98 - 107 mmol/L    CO2 26.0 22.0 - 29.0 mmol/L    Calcium 9.3 8.6 - 10.5 mg/dL    Total Protein 7.0 6.0 - 8.5 g/dL    Albumin 4.00 3.50 - 5.20 g/dL    ALT (SGPT) 8 1 - 33 U/L    AST (SGOT) 13 1 - 32 U/L    Alkaline Phosphatase 79 39 - 117  U/L    Total Bilirubin 0.3 0.0 - 1.2 mg/dL    eGFR Non African Amer 93 >60 mL/min/1.73    Globulin 3.0 gm/dL    A/G Ratio 1.3 g/dL    BUN/Creatinine Ratio 13.6 7.0 - 25.0    Anion Gap 8.0 5.0 - 15.0 mmol/L   Lipase    Collection Time: 07/16/21 10:58 AM    Specimen: Blood   Result Value Ref Range    Lipase 34 13 - 60 U/L   Lactic Acid, Plasma    Collection Time: 07/16/21 10:58 AM    Specimen: Blood   Result Value Ref Range    Lactate 0.7 0.5 - 2.0 mmol/L   Green Top (Gel)    Collection Time: 07/16/21 10:58 AM   Result Value Ref Range    Extra Tube Hold for add-ons.    Lavender Top    Collection Time: 07/16/21 10:58 AM   Result Value Ref Range    Extra Tube hold for add-on    Gold Top - SST    Collection Time: 07/16/21 10:58 AM   Result Value Ref Range    Extra Tube Hold for add-ons.    Gray Top    Collection Time: 07/16/21 10:58 AM   Result Value Ref Range    Extra Tube Hold for add-ons.    CBC Auto Differential    Collection Time: 07/16/21 10:58 AM    Specimen: Blood   Result Value Ref Range    WBC 6.99 3.40 - 10.80 10*3/mm3    RBC 4.53 3.77 - 5.28 10*6/mm3    Hemoglobin 12.9 12.0 - 15.9 g/dL    Hematocrit 40.5 34.0 - 46.6 %    MCV 89.4 79.0 - 97.0 fL    MCH 28.5 26.6 - 33.0 pg    MCHC 31.9 31.5 - 35.7 g/dL    RDW 12.1 (L) 12.3 - 15.4 %    RDW-SD 39.7 37.0 - 54.0 fl    MPV 9.4 6.0 - 12.0 fL    Platelets 285 140 - 450 10*3/mm3    Neutrophil % 57.3 42.7 - 76.0 %    Lymphocyte % 33.0 19.6 - 45.3 %    Monocyte % 7.9 5.0 - 12.0 %    Eosinophil % 1.1 0.3 - 6.2 %    Basophil % 0.4 0.0 - 1.5 %    Immature Grans % 0.3 0.0 - 0.5 %    Neutrophils, Absolute 4.00 1.70 - 7.00 10*3/mm3    Lymphocytes, Absolute 2.31 0.70 - 3.10 10*3/mm3    Monocytes, Absolute 0.55 0.10 - 0.90 10*3/mm3    Eosinophils, Absolute 0.08 0.00 - 0.40 10*3/mm3    Basophils, Absolute 0.03 0.00 - 0.20 10*3/mm3    Immature Grans, Absolute 0.02 0.00 - 0.05 10*3/mm3    nRBC 0.0 0.0 - 0.2 /100 WBC   Urinalysis With Microscopic If Indicated (No Culture) - Urine,  Clean Catch    Collection Time: 07/16/21 10:59 AM    Specimen: Urine, Clean Catch   Result Value Ref Range    Color, UA Yellow Yellow, Straw    Appearance, UA Clear Clear    pH, UA 6.5 5.0 - 8.0    Specific Gravity, UA 1.006 1.001 - 1.030    Glucose, UA Negative Negative    Ketones, UA Negative Negative    Bilirubin, UA Negative Negative    Blood, UA Negative Negative    Protein, UA Negative Negative    Leuk Esterase, UA Negative Negative    Nitrite, UA Negative Negative    Urobilinogen, UA 0.2 E.U./dL 0.2 - 1.0 E.U./dL     Note: In addition to lab results from this visit, the labs listed above may include labs taken at another facility or during a different encounter within the last 24 hours. Please correlate lab times with ED admission and discharge times for further clarification of the services performed during this visit.    CT Abdomen Pelvis With Contrast   Final Result   Lung segment wall thickening and adjacent mesenteric edema   involving the sigmoid colon with numerous adjacent diverticula, most   compatible with acute sigmoid diverticulitis. There is no overt evidence   of perforation or abscess.           This report was finalized on 7/16/2021 2:15 PM by Bobby Damico.            Vitals:    07/16/21 1330 07/16/21 1331 07/16/21 1344 07/16/21 1400   BP: 98/58   101/63   BP Location:       Patient Position:       Pulse:       Resp:       Temp:       TempSrc:       SpO2:  94% 93% 95%   Weight:       Height:         Medications   Sodium Chloride (PF) 0.9 % 10 mL (has no administration in time range)   sodium chloride 0.9 % flush 10 mL (has no administration in time range)   sodium chloride 0.9 % bolus 1,000 mL (0 mL Intravenous Stopped 7/16/21 1402)   HYDROmorphone (DILAUDID) injection 1 mg (1 mg Intravenous Given 7/16/21 1244)   ondansetron (ZOFRAN) injection 4 mg (4 mg Intravenous Given 7/16/21 1244)   iopamidol (ISOVUE-300) 61 % injection 100 mL (100 mL Intravenous Given 7/16/21 1309)   HYDROmorphone  (DILAUDID) injection 1 mg (1 mg Intravenous Given 7/16/21 1504)   levoFLOXacin (LEVAQUIN) tablet 750 mg (750 mg Oral Given 7/16/21 1503)   metroNIDAZOLE (FLAGYL) tablet 500 mg (500 mg Oral Given 7/16/21 1504)     ECG/EMG Results (last 24 hours)     ** No results found for the last 24 hours. **        No orders to display                                       MDM    Final diagnoses:   Right upper quadrant abdominal pain   Diverticulitis       ED Disposition  ED Disposition     ED Disposition Condition Comment    Discharge Stable           Tara Herman PA-C  [address] [facility] [address]  [facility] [id number] [phone]          Eddie Abdi MD  [address] [facility] [address]  [place] [id number] [phone]               Medication List      New Prescriptions    ciprofloxacin 500 MG tablet  Commonly known as: CIPRO  Take 1 tablet by mouth 2 (Two) Times a Day.     metroNIDAZOLE 500 MG tablet  Commonly known as: FLAGYL  Take 1 tablet by mouth 3 (Three) Times a Day.           Where to Get Your Medications      These medications were sent to [facility] - [place], [place] - [address] [place] [address] - [phone]  - [phone] [address] [address]    Phone: [phone]   · ciprofloxacin 500 MG tablet  · metroNIDAZOLE 500 MG tablet          Denise Madrid, APRN  07/16/21 [id number]

## 2021-07-16 NOTE — DISCHARGE INSTRUCTIONS
Keep appointment as scheduled for your colonoscopy.  Follow-up with GI.  Take meds as ordered.  Return as needed.

## 2021-07-26 ENCOUNTER — TELEPHONE (OUTPATIENT)
Dept: GASTROENTEROLOGY | Facility: CLINIC | Age: 56
End: 2021-07-26

## 2021-07-26 NOTE — TELEPHONE ENCOUNTER
MRS. SHAH CALLED TO CHECK ON HER COLON DATE AND TIME. SHE STATES WAS SEEN IN [facility] ON July 16 2021 WE NEED TO MOVE HER COLON OUT A FEW WEEKS DUE TO HER HAVING DIVERTICULITIS. SENT HER TO [facility]

## 2021-08-16 ENCOUNTER — OFFICE VISIT (OUTPATIENT)
Dept: CARDIOLOGY | Facility: CLINIC | Age: 56
End: 2021-08-16

## 2021-08-16 VITALS
SYSTOLIC BLOOD PRESSURE: 142 MMHG | HEIGHT: 60 IN | BODY MASS INDEX: 32.59 KG/M2 | WEIGHT: 166 LBS | OXYGEN SATURATION: 96 % | DIASTOLIC BLOOD PRESSURE: 80 MMHG | HEART RATE: 68 BPM

## 2021-08-16 DIAGNOSIS — I51.81 TAKOTSUBO CARDIOMYOPATHY: Primary | ICD-10-CM

## 2021-08-16 DIAGNOSIS — I10 ESSENTIAL HYPERTENSION: ICD-10-CM

## 2021-08-16 PROCEDURE — 99213 OFFICE O/P EST LOW 20 MIN: CPT | Performed by: NURSE PRACTITIONER

## 2021-08-16 PROCEDURE — 93000 ELECTROCARDIOGRAM COMPLETE: CPT | Performed by: NURSE PRACTITIONER

## 2021-08-16 NOTE — PROGRESS NOTES
[facility] AT [address], [address]  [place], [place], [address] [phone]  [email or web address]           OUTPATIENT CLINIC FOLLOW UP NOTE    Patient Care Team:  Patient Care Team:  Tara Herman PA-C as PCP - General (Physician Assistant)  Bernadette Padilla MD as Consulting Physician (Obstetrics and Gynecology)    Subjective:      Chief Complaint   Patient presents with   • Takotsubo cardiomyopathy       HPI:    Nicki Dillard is a 56 y.o. female.  Employee here at [employer]  Cardiac problem list:  1. Stress-induced cardiomyopathy in 2014, 2018  2. Essential hypertension    He presents today for follow-up.    Since patient was last seen she reports she has been doing well from a cardiac standpoint.  She denies chest pain, shortness of breath, palpitations, lightheadedness, or syncope.  Tolerating metoprolol and Entresto.  BP typically better controlled.    Review of Systems:  Negative for chest pain, dyspnea, lower extremity swelling, palpitations, lightheadedness, syncope.    PFSH:  Patient Active Problem List   Diagnosis   • Menorrhagia   • Chest pain   • Hypertension   • Takotsubo cardiomyopathy   • Well woman exam         Current Outpatient Medications:   •  metoprolol tartrate (LOPRESSOR) 25 MG tablet, Take 0.5 tablets by mouth Every 12 (Twelve) Hours., Disp: 90 tablet, Rfl: 3  •  Multiple Vitamin (MULTI-VITAMIN DAILY PO), Take 1 tablet by mouth Daily., Disp: , Rfl:   •  nitroglycerin (NITROSTAT) 0.4 MG SL tablet, Place 1 tablet under the tongue Every 5 (Five) Minutes As Needed for Chest Pain. Take no more than 3 doses in 15 minutes., Disp: 25 tablet, Rfl: 0  •  sacubitril-valsartan (Entresto) 24-26 MG tablet, Take 1 tablet by mouth Every 12 (Twelve) Hours., Disp: 180 tablet, Rfl: 3    Allergies   Allergen Reactions   • Sudafed [Pseudoephedrine Hcl] Other (See Comments)     Chest pain        reports that she has never smoked. She has never used  "smokeless tobacco.    Family History   Problem Relation Age of Onset   • Hypertension Father    • Heart disease Mother    • Hypertension Sister    • No Known Problems Brother    • No Known Problems Maternal Grandmother    • No Known Problems Maternal Grandfather    • Arthritis Paternal Grandmother    • Hypertension Paternal Grandfather    • No Known Problems Sister    • Breast cancer Neg Hx    • Ovarian cancer Neg Hx    • Colon cancer Neg Hx    • Uterine cancer Neg Hx    • Osteoporosis Neg Hx          Objective:   Blood pressure 142/80, pulse 68, height 152.4 cm (60\"), weight 75.3 kg (166 lb), last menstrual period 07/01/2017, SpO2 96 %, not currently breastfeeding.  CONSTITUTIONAL: No acute distress  RESPIRATORY: Normal effort. Clear to auscultation bilaterally without wheezing or rales  CARDIOVASCULAR: Regular rate and rhythm with normal S1 and S2. Without murmur, gallop or rub.  Carotids without bruits bilaterally.  PERIPHERAL VASCULAR: Normal radial pulse. There is no lower extremity edema bilaterally.  PSYCH: Normal affect and mood    Labs:    Glucose   Date Value Ref Range Status   07/16/2021 106 (H) 65 - 99 mg/dL Final     BUN   Date Value Ref Range Status   07/16/2021 9 6 - 20 mg/dL Final     Creatinine   Date Value Ref Range Status   07/16/2021 0.66 0.57 - 1.00 mg/dL Final     Sodium   Date Value Ref Range Status   07/16/2021 138 136 - 145 mmol/L Final     Potassium   Date Value Ref Range Status   07/16/2021 4.2 3.5 - 5.2 mmol/L Final     Comment:     Slight hemolysis detected by analyzer. Results may be affected.     Chloride   Date Value Ref Range Status   07/16/2021 104 98 - 107 mmol/L Final     CO2   Date Value Ref Range Status   07/16/2021 26.0 22.0 - 29.0 mmol/L Final     Calcium   Date Value Ref Range Status   07/16/2021 9.3 8.6 - 10.5 mg/dL Final     Total Protein   Date Value Ref Range Status   07/16/2021 7.0 6.0 - 8.5 g/dL Final     Albumin   Date Value Ref Range Status   07/16/2021 4.00 3.50 - " 5.20 g/dL Final     ALT (SGPT)   Date Value Ref Range Status   07/16/2021 8 1 - 33 U/L Final     AST (SGOT)   Date Value Ref Range Status   07/16/2021 13 1 - 32 U/L Final     Alkaline Phosphatase   Date Value Ref Range Status   07/16/2021 79 39 - 117 U/L Final     Total Bilirubin   Date Value Ref Range Status   07/16/2021 0.3 0.0 - 1.2 mg/dL Final     eGFR Non  Amer   Date Value Ref Range Status   07/16/2021 93 >60 mL/min/1.73 Final     BUN/Creatinine Ratio   Date Value Ref Range Status   07/16/2021 13.6 7.0 - 25.0 Final     Anion Gap   Date Value Ref Range Status   07/16/2021 8.0 5.0 - 15.0 mmol/L Final       Lab Results   Component Value Date    CHOL 165 05/17/2021    CHOL 201 (H) 05/25/2018     Lab Results   Component Value Date    TRIG 68 05/17/2021    TRIG 115 05/25/2018    TRIG 87 06/27/2014     Lab Results   Component Value Date    HDL 62 (H) 05/17/2021    HDL 65 (H) 05/25/2018    HDL 48 06/27/2014     No components found for: LDLCALC  Lab Results   Component Value Date    VLDL 13 05/17/2021     Lab Results   Component Value Date    LDLHDL 1.44 05/17/2021       Diagnostic Data:      ECG 12 Lead    Date/Time: 8/16/2021 2:26 PM  Performed by: Katlyn Real APRN  Authorized by: Katlyn Real APRN   Comparison: compared with previous ECG from 7/2/2018  Similar to previous ECG  Rhythm: sinus rhythm  Rate: normal  BPM: 68  Other findings: non-specific ST-T wave changes  Comments: QRS 76 ms   ms            Limited TTE 9/2018  · This was a limited echocardiogram to assess for left ventricular ejection fraction only.  · Left ventricular systolic function is normal. Estimated EF appears to be in the range of 56 - 60%.  · Left ventricular wall thickness is consistent with borderline concentric hypertrophy.    TTE 5/2018  · Left ventricular systolic function is moderately decreased. Estimated EF = 30%.  · The following left ventricular wall segments are hypokinetic: mid anterior, apical anterior, mid  anterolateral, apical lateral, mid inferolateral, apical inferior, mid inferior, apical septal, mid inferoseptal and mid anteroseptal.  · The findings are consistent with stress-induced (Takotsubo) cardiomyopathy.  · Left ventricular diastolic dysfunction (grade II) consistent with pseudonormalization.  · Mild mitral valve regurgitation is present    [facility] 5/2018  · The coronary anatomy was normal.  There were no flow limiting, obstructive coronary stenoses.  · Left ventricular ejection fraction of 40% by quantitative analysis.  There is hypokinesis of the mid to distal segments but preservation of apex.  Findings are consistent with an atypical stress-induced cardiomyopathy      Assessment and Plan:   Nicki was seen today for heart problem.    Takotsubo cardiomyopathy  Essential hypertension  -NYHA class I  -Continue metoprolol and Entresto.  -Sublingual nitroglycerin as needed.    - Return in about 1 year (around 8/16/2022) for Next scheduled follow up.    Katlyn Real, ESTRADA  08/16/21 14:27 EDT

## 2021-09-01 ENCOUNTER — TRANSCRIBE ORDERS (OUTPATIENT)
Dept: ADMINISTRATIVE | Facility: HOSPITAL | Age: 56
End: 2021-09-01

## 2021-09-01 DIAGNOSIS — Z12.31 VISIT FOR SCREENING MAMMOGRAM: Primary | ICD-10-CM

## 2021-09-03 RX ORDER — SODIUM, POTASSIUM,MAG SULFATES 17.5-3.13G
2 SOLUTION, RECONSTITUTED, ORAL ORAL TAKE AS DIRECTED
Qty: 354 ML | Refills: 0 | Status: SHIPPED | OUTPATIENT
Start: 2021-09-03 | End: 2022-09-12

## 2021-09-16 ENCOUNTER — HOSPITAL ENCOUNTER (OUTPATIENT)
Dept: MAMMOGRAPHY | Facility: HOSPITAL | Age: 56
Discharge: HOME | End: 2021-09-16
Admitting: OBSTETRICS & GYNECOLOGY

## 2021-09-16 DIAGNOSIS — Z12.31 VISIT FOR SCREENING MAMMOGRAM: ICD-10-CM

## 2021-09-16 PROCEDURE — 77063 BREAST TOMOSYNTHESIS BI: CPT

## 2021-09-16 PROCEDURE — 77067 SCR MAMMO BI INCL CAD: CPT | Performed by: RADIOLOGY

## 2021-09-16 PROCEDURE — 77067 SCR MAMMO BI INCL CAD: CPT

## 2021-09-16 PROCEDURE — 77063 BREAST TOMOSYNTHESIS BI: CPT | Performed by: RADIOLOGY

## 2021-11-08 RX ORDER — SACUBITRIL AND VALSARTAN 24; 26 MG/1; MG/1
1 TABLET, FILM COATED ORAL EVERY 12 HOURS
Qty: 180 TABLET | Refills: 3 | Status: SHIPPED | OUTPATIENT
Start: 2021-11-08 | End: 2022-11-14 | Stop reason: SDUPTHER

## 2021-11-09 RX ORDER — SOD SULF/POT CHLORIDE/MAG SULF 1.479 G
1 TABLET ORAL ONCE
Qty: 24 TABLET | Refills: 0 | Status: SHIPPED | OUTPATIENT
Start: 2021-11-09 | End: 2021-11-09

## 2022-09-12 ENCOUNTER — OFFICE VISIT (OUTPATIENT)
Dept: CARDIOLOGY | Facility: CLINIC | Age: 57
End: 2022-09-12

## 2022-09-12 VITALS
SYSTOLIC BLOOD PRESSURE: 122 MMHG | OXYGEN SATURATION: 92 % | HEART RATE: 69 BPM | DIASTOLIC BLOOD PRESSURE: 84 MMHG | WEIGHT: 175.6 LBS | BODY MASS INDEX: 34.47 KG/M2 | HEIGHT: 60 IN

## 2022-09-12 DIAGNOSIS — I10 ESSENTIAL HYPERTENSION: ICD-10-CM

## 2022-09-12 DIAGNOSIS — I51.81 TAKOTSUBO CARDIOMYOPATHY: Primary | ICD-10-CM

## 2022-09-12 DIAGNOSIS — I10 PRIMARY HYPERTENSION: ICD-10-CM

## 2022-09-12 PROCEDURE — 99214 OFFICE O/P EST MOD 30 MIN: CPT | Performed by: INTERNAL MEDICINE

## 2022-09-12 PROCEDURE — 93000 ELECTROCARDIOGRAM COMPLETE: CPT | Performed by: INTERNAL MEDICINE

## 2022-09-12 NOTE — PROGRESS NOTES
[facility]   [address] [place] [address], [address]  [place], [place], [id number] [phone]  [email or web address]           OUTPATIENT CLINIC FOLLOW UP NOTE    Patient Care Team:  Patient Care Team:  Scout Navarro MD as PCP - General (Family Medicine)  Bernadette Padilla MD as Consulting Physician (Obstetrics and Gynecology)    Subjective:      Chief Complaint   Patient presents with   • Takotsubo cardiomyopathy       HPI:    Nicki Dillard is a 57 y.o. female.  Employee here at [employer]  Cardiac problem list:  1. Stress-induced cardiomyopathy in 2014, 2018  2. Essential hypertension    She presents today for follow-up.    Overall patient has been doing well from a cardiac standpoint since her last visit.  Notes occasional mild, brief chest pains.  Did have one episode about 4 months ago that was more severe requiring her to take 1 nitroglycerin that relieved her pain.  No similar recurrent episodes.  She notes she has been under increased stress over the last 6 months due to the passing of her mom in February 2022.  Denies shortness of breath, palpitations, lower extremity edema, lightheadedness or syncope.    Review of Systems:  As noted in HPI.     PFSH:  Patient Active Problem List   Diagnosis   • Menorrhagia   • Chest pain   • Hypertension   • Takotsubo cardiomyopathy   • Well woman exam         Current Outpatient Medications:   •  metoprolol tartrate (LOPRESSOR) 25 MG tablet, Take 0.5 (one-half) tablet by mouth Every 12 (Twelve) Hours., Disp: 90 tablet, Rfl: 3  •  Multiple Vitamin (MULTI-VITAMIN DAILY PO), Take 1 tablet by mouth Daily., Disp: , Rfl:   •  nitroglycerin (NITROSTAT) 0.4 MG SL tablet, Place 1 tablet under the tongue Every 5 (Five) Minutes As Needed for Chest Pain. Take no more than 3 doses in 15 minutes., Disp: 25 tablet, Rfl: 0  •  sacubitril-valsartan (Entresto) 24-26 MG tablet, Take 1 tablet by mouth Every 12 (Twelve) Hours., Disp: 180 tablet, Rfl:  "3    Allergies   Allergen Reactions   • Sudafed [Pseudoephedrine Hcl] Other (See Comments)     Chest pain        reports that she has never smoked. She has never used smokeless tobacco.    Family History   Problem Relation Age of Onset   • Hypertension Father    • Heart disease Mother    • Hypertension Sister    • No Known Problems Brother    • No Known Problems Maternal Grandmother    • No Known Problems Maternal Grandfather    • Arthritis Paternal Grandmother    • Hypertension Paternal Grandfather    • No Known Problems Sister    • Breast cancer Neg Hx    • Ovarian cancer Neg Hx    • Colon cancer Neg Hx    • Uterine cancer Neg Hx    • Osteoporosis Neg Hx          Objective:   Blood pressure 122/84, pulse 69, height 152.4 cm (60\"), weight 79.7 kg (175 lb 9.6 oz), last menstrual period 07/01/2017, SpO2 92 %, not currently breastfeeding.   CONSTITUTIONAL: No acute distress  RESPIRATORY: Normal effort. Clear to auscultation bilaterally without wheezing or rales  CARDIOVASCULAR: Regular rate and rhythm with normal S1 and S2. Without murmur.  PERIPHERAL VASCULAR: Normal radial pulse. There is no lower extremity edema bilaterally.        Labs:    Glucose   Date Value Ref Range Status   07/16/2021 106 (H) 65 - 99 mg/dL Final     BUN   Date Value Ref Range Status   07/16/2021 9 6 - 20 mg/dL Final     Creatinine   Date Value Ref Range Status   07/16/2021 0.66 0.57 - 1.00 mg/dL Final     Sodium   Date Value Ref Range Status   07/16/2021 138 136 - 145 mmol/L Final     Potassium   Date Value Ref Range Status   07/16/2021 4.2 3.5 - 5.2 mmol/L Final     Comment:     Slight hemolysis detected by analyzer. Results may be affected.     Chloride   Date Value Ref Range Status   07/16/2021 104 98 - 107 mmol/L Final     CO2   Date Value Ref Range Status   07/16/2021 26.0 22.0 - 29.0 mmol/L Final     Calcium   Date Value Ref Range Status   07/16/2021 9.3 8.6 - 10.5 mg/dL Final     Total Protein   Date Value Ref Range Status   07/16/2021 " 7.0 6.0 - 8.5 g/dL Final     Albumin   Date Value Ref Range Status   07/16/2021 4.00 3.50 - 5.20 g/dL Final     ALT (SGPT)   Date Value Ref Range Status   07/16/2021 8 1 - 33 U/L Final     AST (SGOT)   Date Value Ref Range Status   07/16/2021 13 1 - 32 U/L Final     Alkaline Phosphatase   Date Value Ref Range Status   07/16/2021 79 39 - 117 U/L Final     Total Bilirubin   Date Value Ref Range Status   07/16/2021 0.3 0.0 - 1.2 mg/dL Final     eGFR Non  Amer   Date Value Ref Range Status   07/16/2021 93 >60 mL/min/1.73 Final     BUN/Creatinine Ratio   Date Value Ref Range Status   07/16/2021 13.6 7.0 - 25.0 Final     Anion Gap   Date Value Ref Range Status   07/16/2021 8.0 5.0 - 15.0 mmol/L Final       Lab Results   Component Value Date    CHOL 165 05/17/2021    CHOL 201 (H) 05/25/2018     Lab Results   Component Value Date    TRIG 68 05/17/2021    TRIG 115 05/25/2018    TRIG 87 06/27/2014     Lab Results   Component Value Date    HDL 62 (H) 05/17/2021    HDL 65 (H) 05/25/2018    HDL 48 06/27/2014     No components found for: LDLCALC  Lab Results   Component Value Date    VLDL 13 05/17/2021     Lab Results   Component Value Date    LDLHDL 1.44 05/17/2021       Diagnostic Data:      ECG 12 Lead    Date/Time: 9/12/2022 4:04 PM  Performed by: Serge Valenzuela MD  Authorized by: Serge Valenzuela MD   Comparison: compared with previous ECG from 8/16/2021  Similar to previous ECG  Comparison to previous ECG: NSC  Rhythm: sinus rhythm  Rate: normal  BPM: 71  Conduction: conduction normal  Other findings: non-specific ST-T wave changes            Limited TTE 9/2018  · This was a limited echocardiogram to assess for left ventricular ejection fraction only.  · Left ventricular systolic function is normal. Estimated EF appears to be in the range of 56 - 60%.  · Left ventricular wall thickness is consistent with borderline concentric hypertrophy.    TTE 5/2018  · Left ventricular systolic function is moderately decreased.  Estimated EF = 30%.  · The following left ventricular wall segments are hypokinetic: mid anterior, apical anterior, mid anterolateral, apical lateral, mid inferolateral, apical inferior, mid inferior, apical septal, mid inferoseptal and mid anteroseptal.  · The findings are consistent with stress-induced (Takotsubo) cardiomyopathy.  · Left ventricular diastolic dysfunction (grade II) consistent with pseudonormalization.  · Mild mitral valve regurgitation is present    [facility] 5/2018  · The coronary anatomy was normal.  There were no flow limiting, obstructive coronary stenoses.  · Left ventricular ejection fraction of 40% by quantitative analysis.  There is hypokinesis of the mid to distal segments but preservation of apex.  Findings are consistent with an atypical stress-induced cardiomyopathy      Assessment and Plan:     Takotsubo cardiomyopathy  Essential hypertension  -NYHA class I  -Continue metoprolol and Entresto.  -Sublingual nitroglycerin as needed.    - Return in about 1 year (around 9/12/2023) for Next scheduled follow up with EKG .    Scribed for Serge Valenzuela MD by Fabiola Marques, APRN. 9/12/2022  16:06 EDT      I, Serge Valenzuela MD, personally performed the services as scribed by the above named individual. I have made any necessary edits and it is both accurate and complete.     Serge Valenzuela MD, MSc, FACC, [facility]  Interventional Cardiology  [facility]

## 2022-09-14 ENCOUNTER — TRANSCRIBE ORDERS (OUTPATIENT)
Dept: OBSTETRICS AND GYNECOLOGY | Facility: CLINIC | Age: 57
End: 2022-09-14

## 2022-09-14 DIAGNOSIS — Z12.31 ENCOUNTER FOR SCREENING MAMMOGRAM FOR BREAST CANCER: Primary | ICD-10-CM

## 2022-10-17 ENCOUNTER — HOSPITAL ENCOUNTER (OUTPATIENT)
Dept: MAMMOGRAPHY | Facility: HOSPITAL | Age: 57
Discharge: HOME | End: 2022-10-17
Admitting: OBSTETRICS & GYNECOLOGY

## 2022-10-17 DIAGNOSIS — Z12.31 ENCOUNTER FOR SCREENING MAMMOGRAM FOR BREAST CANCER: ICD-10-CM

## 2022-10-17 PROCEDURE — 77067 SCR MAMMO BI INCL CAD: CPT | Performed by: RADIOLOGY

## 2022-10-17 PROCEDURE — 77067 SCR MAMMO BI INCL CAD: CPT

## 2022-10-17 PROCEDURE — 77063 BREAST TOMOSYNTHESIS BI: CPT

## 2022-10-17 PROCEDURE — 77063 BREAST TOMOSYNTHESIS BI: CPT | Performed by: RADIOLOGY

## 2022-11-14 RX ORDER — SACUBITRIL AND VALSARTAN 24; 26 MG/1; MG/1
1 TABLET, FILM COATED ORAL EVERY 12 HOURS
Qty: 180 TABLET | Refills: 3 | Status: SHIPPED | OUTPATIENT
Start: 2022-11-14

## 2023-05-29 ENCOUNTER — APPOINTMENT (OUTPATIENT)
Dept: CT IMAGING | Facility: HOSPITAL | Age: 58
End: 2023-05-29

## 2023-05-29 ENCOUNTER — HOSPITAL ENCOUNTER (EMERGENCY)
Facility: HOSPITAL | Age: 58
Discharge: HOME | End: 2023-05-29
Attending: EMERGENCY MEDICINE | Admitting: EMERGENCY MEDICINE

## 2023-05-29 VITALS
RESPIRATION RATE: 16 BRPM | DIASTOLIC BLOOD PRESSURE: 61 MMHG | WEIGHT: 180 LBS | OXYGEN SATURATION: 97 % | TEMPERATURE: 97.8 F | HEIGHT: 60 IN | HEART RATE: 76 BPM | SYSTOLIC BLOOD PRESSURE: 99 MMHG | BODY MASS INDEX: 35.34 KG/M2

## 2023-05-29 DIAGNOSIS — R11.0 NAUSEA: ICD-10-CM

## 2023-05-29 DIAGNOSIS — R10.13 EPIGASTRIC ABDOMINAL PAIN: Primary | ICD-10-CM

## 2023-05-29 LAB
ALBUMIN SERPL-MCNC: 3.9 G/DL (ref 3.5–5.2)
ALBUMIN/GLOB SERPL: 1.4 G/DL
ALP SERPL-CCNC: 74 U/L (ref 39–117)
ALT SERPL W P-5'-P-CCNC: 10 U/L (ref 1–33)
ANION GAP SERPL CALCULATED.3IONS-SCNC: 12 MMOL/L (ref 5–15)
AST SERPL-CCNC: 18 U/L (ref 1–32)
BASOPHILS # BLD AUTO: 0.04 10*3/MM3 (ref 0–0.2)
BASOPHILS NFR BLD AUTO: 0.5 % (ref 0–1.5)
BILIRUB SERPL-MCNC: 0.3 MG/DL (ref 0–1.2)
BILIRUB UR QL STRIP: NEGATIVE
BUN SERPL-MCNC: 8 MG/DL (ref 6–20)
BUN/CREAT SERPL: 12.1 (ref 7–25)
CALCIUM SPEC-SCNC: 9.5 MG/DL (ref 8.6–10.5)
CHLORIDE SERPL-SCNC: 103 MMOL/L (ref 98–107)
CLARITY UR: CLEAR
CO2 SERPL-SCNC: 26 MMOL/L (ref 22–29)
COLOR UR: YELLOW
CREAT SERPL-MCNC: 0.66 MG/DL (ref 0.57–1)
DEPRECATED RDW RBC AUTO: 39.2 FL (ref 37–54)
EGFRCR SERPLBLD CKD-EPI 2021: 102.5 ML/MIN/1.73
EOSINOPHIL # BLD AUTO: 0.11 10*3/MM3 (ref 0–0.4)
EOSINOPHIL NFR BLD AUTO: 1.4 % (ref 0.3–6.2)
ERYTHROCYTE [DISTWIDTH] IN BLOOD BY AUTOMATED COUNT: 12 % (ref 12.3–15.4)
GLOBULIN UR ELPH-MCNC: 2.7 GM/DL
GLUCOSE SERPL-MCNC: 112 MG/DL (ref 65–99)
GLUCOSE UR STRIP-MCNC: NEGATIVE MG/DL
HCT VFR BLD AUTO: 40.5 % (ref 34–46.6)
HGB BLD-MCNC: 13.3 G/DL (ref 12–15.9)
HGB UR QL STRIP.AUTO: NEGATIVE
HOLD SPECIMEN: NORMAL
IMM GRANULOCYTES # BLD AUTO: 0.09 10*3/MM3 (ref 0–0.05)
IMM GRANULOCYTES NFR BLD AUTO: 1.2 % (ref 0–0.5)
KETONES UR QL STRIP: NEGATIVE
LEUKOCYTE ESTERASE UR QL STRIP.AUTO: NEGATIVE
LIPASE SERPL-CCNC: 30 U/L (ref 13–60)
LYMPHOCYTES # BLD AUTO: 1.76 10*3/MM3 (ref 0.7–3.1)
LYMPHOCYTES NFR BLD AUTO: 22.5 % (ref 19.6–45.3)
MCH RBC QN AUTO: 29.4 PG (ref 26.6–33)
MCHC RBC AUTO-ENTMCNC: 32.8 G/DL (ref 31.5–35.7)
MCV RBC AUTO: 89.6 FL (ref 79–97)
MONOCYTES # BLD AUTO: 0.5 10*3/MM3 (ref 0.1–0.9)
MONOCYTES NFR BLD AUTO: 6.4 % (ref 5–12)
NEUTROPHILS NFR BLD AUTO: 5.31 10*3/MM3 (ref 1.7–7)
NEUTROPHILS NFR BLD AUTO: 68 % (ref 42.7–76)
NITRITE UR QL STRIP: NEGATIVE
NRBC BLD AUTO-RTO: 0 /100 WBC (ref 0–0.2)
PH UR STRIP.AUTO: 6.5 [PH] (ref 5–8)
PLATELET # BLD AUTO: 315 10*3/MM3 (ref 140–450)
PMV BLD AUTO: 9.6 FL (ref 6–12)
POTASSIUM SERPL-SCNC: 4.6 MMOL/L (ref 3.5–5.2)
PROT SERPL-MCNC: 6.6 G/DL (ref 6–8.5)
PROT UR QL STRIP: NEGATIVE
RBC # BLD AUTO: 4.52 10*6/MM3 (ref 3.77–5.28)
SODIUM SERPL-SCNC: 141 MMOL/L (ref 136–145)
SP GR UR STRIP: 1.01 (ref 1–1.03)
TROPONIN T SERPL HS-MCNC: <6 NG/L
TROPONIN T SERPL HS-MCNC: <6 NG/L
UROBILINOGEN UR QL STRIP: NORMAL
WBC NRBC COR # BLD: 7.81 10*3/MM3 (ref 3.4–10.8)
WHOLE BLOOD HOLD COAG: NORMAL
WHOLE BLOOD HOLD SPECIMEN: NORMAL

## 2023-05-29 PROCEDURE — 96374 THER/PROPH/DIAG INJ IV PUSH: CPT

## 2023-05-29 PROCEDURE — 74176 CT ABD & PELVIS W/O CONTRAST: CPT

## 2023-05-29 PROCEDURE — 93005 ELECTROCARDIOGRAM TRACING: CPT

## 2023-05-29 PROCEDURE — 81003 URINALYSIS AUTO W/O SCOPE: CPT

## 2023-05-29 PROCEDURE — 99284 EMERGENCY DEPT VISIT MOD MDM: CPT

## 2023-05-29 PROCEDURE — 96375 TX/PRO/DX INJ NEW DRUG ADDON: CPT

## 2023-05-29 PROCEDURE — 25010000002 MORPHINE PER 10 MG: Performed by: EMERGENCY MEDICINE

## 2023-05-29 PROCEDURE — 84484 ASSAY OF TROPONIN QUANT: CPT | Performed by: EMERGENCY MEDICINE

## 2023-05-29 PROCEDURE — 83690 ASSAY OF LIPASE: CPT

## 2023-05-29 PROCEDURE — 84484 ASSAY OF TROPONIN QUANT: CPT

## 2023-05-29 PROCEDURE — 80053 COMPREHEN METABOLIC PANEL: CPT | Performed by: EMERGENCY MEDICINE

## 2023-05-29 PROCEDURE — 25010000002 ONDANSETRON PER 1 MG: Performed by: EMERGENCY MEDICINE

## 2023-05-29 PROCEDURE — 36415 COLL VENOUS BLD VENIPUNCTURE: CPT

## 2023-05-29 PROCEDURE — 85025 COMPLETE CBC W/AUTO DIFF WBC: CPT

## 2023-05-29 PROCEDURE — 93005 ELECTROCARDIOGRAM TRACING: CPT | Performed by: EMERGENCY MEDICINE

## 2023-05-29 RX ORDER — FAMOTIDINE 20 MG/1
20 TABLET, FILM COATED ORAL 2 TIMES DAILY
Qty: 20 TABLET | Refills: 0 | Status: SHIPPED | OUTPATIENT
Start: 2023-05-29

## 2023-05-29 RX ORDER — ONDANSETRON 2 MG/ML
4 INJECTION INTRAMUSCULAR; INTRAVENOUS ONCE
Status: COMPLETED | OUTPATIENT
Start: 2023-05-29 | End: 2023-05-29

## 2023-05-29 RX ORDER — SODIUM CHLORIDE 0.9 % (FLUSH) 0.9 %
10 SYRINGE (ML) INJECTION AS NEEDED
Status: DISCONTINUED | OUTPATIENT
Start: 2023-05-29 | End: 2023-05-29 | Stop reason: HOSPADM

## 2023-05-29 RX ORDER — ONDANSETRON 4 MG/1
4 TABLET, ORALLY DISINTEGRATING ORAL EVERY 6 HOURS PRN
Qty: 10 TABLET | Refills: 0 | Status: SHIPPED | OUTPATIENT
Start: 2023-05-29

## 2023-05-29 RX ORDER — MORPHINE SULFATE 4 MG/ML
4 INJECTION, SOLUTION INTRAMUSCULAR; INTRAVENOUS ONCE
Status: COMPLETED | OUTPATIENT
Start: 2023-05-29 | End: 2023-05-29

## 2023-05-29 RX ORDER — HYDROCODONE BITARTRATE AND ACETAMINOPHEN 5; 325 MG/1; MG/1
1 TABLET ORAL EVERY 6 HOURS PRN
Qty: 10 TABLET | Refills: 0 | Status: SHIPPED | OUTPATIENT
Start: 2023-05-29

## 2023-05-29 RX ADMIN — MORPHINE SULFATE 4 MG: 4 INJECTION INTRAVENOUS at 04:13

## 2023-05-29 RX ADMIN — ONDANSETRON 4 MG: 2 INJECTION INTRAMUSCULAR; INTRAVENOUS at 04:13

## 2023-05-29 RX ADMIN — SODIUM CHLORIDE 1000 ML: 9 INJECTION, SOLUTION INTRAVENOUS at 04:11

## 2023-05-29 NOTE — DISCHARGE INSTRUCTIONS
You may have a Meckel's diverticulum seen on CT scan today.  Your pain may be due to biliary dyskinesia or biliary colic, although no gallstones or gallbladder abnormalities were seen on your CAT scan today.  Your pain may be due to gastritis or stomach ulcers.  You can take Tylenol as needed for mild pain as directed on package over-the-counter.  Avoid ibuprofen or naproxen until symptoms resolve, as they may irritate the gastric lining.  Follow-up with primary care provider.  If your symptoms persist, you may benefit from EGD test to evaluate for gastritis or stomach ulcers, typically done by a gastroenterologist.  Drink plan of clear liquids, and stop the new fiber supplements that you have been taking.   rollator

## 2023-05-30 NOTE — ED PROVIDER NOTES
Subjective   History of Present Illness  58 year old female presents to the emergency department accompanied by her spouse with concerns about a few hours of severe 10/10 nonradiating epigastric pain which started at approximately 1930, after eating pizza at approximately 1700. She reports associated nausea and nonbloody emesis. Pain is worse with movement and improves with remaining still. She denies acute changes in bowel habitus or acute urinary symptoms recently. Denies recent fever.             Past Medical History:   Diagnosis Date   • Arthritis    • Diverticulitis    • Hypertension    • Takotsubo cardiomyopathy    • Urinary leakage        Allergies   Allergen Reactions   • Sudafed [Pseudoephedrine Hcl] Other (See Comments)     Chest pain       Past Surgical History:   Procedure Laterality Date   • ADENOIDECTOMY     • CARDIAC CATHETERIZATION N/A 2018    Procedure: Left Heart Cath;  Surgeon: Serge Valenzuela MD;  Location: [facility] include [facility] beds at the [facility] CATH INVASIVE LOC[facility];  Service: Cardiovascular   •  SECTION     • HAND SURGERY Right    • HERNIA REPAIR     • TONSILLECTOMY     • TUBAL ABDOMINAL LIGATION     umbilical hernia repair with mesh  C-sections x3    Family History   Problem Relation Age of Onset   • Hypertension Father    • Heart disease Mother    • Hypertension Sister    • No Known Problems Brother    • No Known Problems Maternal Grandmother    • No Known Problems Maternal Grandfather    • Arthritis Paternal Grandmother    • Hypertension Paternal Grandfather    • No Known Problems Sister    • Breast cancer Neg Hx    • Ovarian cancer Neg Hx    • Colon cancer Neg Hx    • Uterine cancer Neg Hx    • Osteoporosis Neg Hx        Social History     Socioeconomic History   • Marital status:    Tobacco Use   • Smoking status: Never   • Smokeless tobacco: Never   Vaping Use   • Vaping Use: Never used   Substance and Sexual Activity   • Alcohol use: Not Currently   • Drug use: No   • Sexual activity: Yes      Partners: Male     Birth control/protection: Post-menopausal           Objective   Physical Exam  Vitals and nursing note reviewed.   Constitutional:       Appearance: She is not diaphoretic.      Comments: Appears uncomfortable. Avoids movement.   HENT:      Mouth/Throat:      Comments: Tacky mucosa  Eyes:      General: No scleral icterus.  Cardiovascular:      Rate and Rhythm: Normal rate and regular rhythm.      Heart sounds: No murmur heard.    No friction rub. No gallop.      Comments: S1, S2  Pulmonary:      Effort: Pulmonary effort is normal. No respiratory distress.      Breath sounds: Normal breath sounds. No stridor. No wheezing, rhonchi or rales.   Abdominal:      Palpations: Abdomen is soft.      Tenderness: There is abdominal tenderness in the epigastric area.   Skin:     General: Skin is warm and dry.      Coloration: Skin is not cyanotic.      Comments: Mild pallor.   Neurological:      Mental Status: She is alert.      Comments: Normal speech, no dysarthria.          Procedures           ED Course           Prior to discharge, she is feeling much better. Pallor has resolved. Results and possible next steps discussed with patient and spouse at bedside. All questions addressed.                         ANDRES reviewed by Bina Sanches MD       Medical Decision Making  Epigastric abdominal pain: complicated acute illness or injury  Nausea: complicated acute illness or injury  Amount and/or Complexity of Data Reviewed  Labs: ordered. Decision-making details documented in ED Course.  Radiology: ordered. Decision-making details documented in ED Course.  ECG/medicine tests: ordered and independent interpretation performed. Decision-making details documented in ED Course.      Risk  Prescription drug management.        Recent Results (from the past 24 hour(s))   ECG 12 Lead ED Triage Standing Order; Abdominal Pain (Upper)    Collection Time: 05/29/23  1:39 AM   Result Value Ref Range    QT Interval 396 ms     QTC Interval 415 ms   Comprehensive Metabolic Panel    Collection Time: 05/29/23  2:21 AM    Specimen: Blood   Result Value Ref Range    Glucose 112 (H) 65 - 99 mg/dL    BUN 8 6 - 20 mg/dL    Creatinine 0.66 0.57 - 1.00 mg/dL    Sodium 141 136 - 145 mmol/L    Potassium 4.6 3.5 - 5.2 mmol/L    Chloride 103 98 - 107 mmol/L    CO2 26.0 22.0 - 29.0 mmol/L    Calcium 9.5 8.6 - 10.5 mg/dL    Total Protein 6.6 6.0 - 8.5 g/dL    Albumin 3.9 3.5 - 5.2 g/dL    ALT (SGPT) 10 1 - 33 U/L    AST (SGOT) 18 1 - 32 U/L    Alkaline Phosphatase 74 39 - 117 U/L    Total Bilirubin 0.3 0.0 - 1.2 mg/dL    Globulin 2.7 gm/dL    A/G Ratio 1.4 g/dL    BUN/Creatinine Ratio 12.1 7.0 - 25.0    Anion Gap 12.0 5.0 - 15.0 mmol/L    eGFR 102.5 >60.0 mL/min/1.73   Lipase    Collection Time: 05/29/23  2:21 AM    Specimen: Blood   Result Value Ref Range    Lipase 30 13 - 60 U/L   Single High Sensitivity Troponin T    Collection Time: 05/29/23  2:21 AM    Specimen: Blood   Result Value Ref Range    HS Troponin T <6 <10 ng/L   Green Top (Gel)    Collection Time: 05/29/23  2:21 AM   Result Value Ref Range    Extra Tube Hold for add-ons.    Lavender Top    Collection Time: 05/29/23  2:21 AM   Result Value Ref Range    Extra Tube hold for add-on    Gold Top - SST    Collection Time: 05/29/23  2:21 AM   Result Value Ref Range    Extra Tube Hold for add-ons.    Gray Top    Collection Time: 05/29/23  2:21 AM   Result Value Ref Range    Extra Tube Hold for add-ons.    Light Blue Top    Collection Time: 05/29/23  2:21 AM   Result Value Ref Range    Extra Tube Hold for add-ons.    CBC Auto Differential    Collection Time: 05/29/23  2:21 AM    Specimen: Blood   Result Value Ref Range    WBC 7.81 3.40 - 10.80 10*3/mm3    RBC 4.52 3.77 - 5.28 10*6/mm3    Hemoglobin 13.3 12.0 - 15.9 g/dL    Hematocrit 40.5 34.0 - 46.6 %    MCV 89.6 79.0 - 97.0 fL    MCH 29.4 26.6 - 33.0 pg    MCHC 32.8 31.5 - 35.7 g/dL    RDW 12.0 (L) 12.3 - 15.4 %    RDW-SD 39.2 37.0 - 54.0 fl     MPV 9.6 6.0 - 12.0 fL    Platelets 315 140 - 450 10*3/mm3    Neutrophil % 68.0 42.7 - 76.0 %    Lymphocyte % 22.5 19.6 - 45.3 %    Monocyte % 6.4 5.0 - 12.0 %    Eosinophil % 1.4 0.3 - 6.2 %    Basophil % 0.5 0.0 - 1.5 %    Immature Grans % 1.2 (H) 0.0 - 0.5 %    Neutrophils, Absolute 5.31 1.70 - 7.00 10*3/mm3    Lymphocytes, Absolute 1.76 0.70 - 3.10 10*3/mm3    Monocytes, Absolute 0.50 0.10 - 0.90 10*3/mm3    Eosinophils, Absolute 0.11 0.00 - 0.40 10*3/mm3    Basophils, Absolute 0.04 0.00 - 0.20 10*3/mm3    Immature Grans, Absolute 0.09 (H) 0.00 - 0.05 10*3/mm3    nRBC 0.0 0.0 - 0.2 /100 WBC   Urinalysis With Microscopic If Indicated (No Culture) - Urine, Clean Catch    Collection Time: 05/29/23  2:41 AM    Specimen: Urine, Clean Catch   Result Value Ref Range    Color, UA Yellow Yellow, Straw    Appearance, UA Clear Clear    pH, UA 6.5 5.0 - 8.0    Specific Gravity, UA 1.015 1.001 - 1.030    Glucose, UA Negative Negative    Ketones, UA Negative Negative    Bilirubin, UA Negative Negative    Blood, UA Negative Negative    Protein, UA Negative Negative    Leuk Esterase, UA Negative Negative    Nitrite, UA Negative Negative    Urobilinogen, UA 0.2 E.U./dL 0.2 - 1.0 E.U./dL   ECG 12 Lead Chest Pain    Collection Time: 05/29/23  3:57 AM   Result Value Ref Range    QT Interval 432 ms    QTC Interval 442 ms   Single High Sensitivity Troponin T    Collection Time: 05/29/23  4:10 AM    Specimen: Blood   Result Value Ref Range    HS Troponin T <6 <10 ng/L     Note: In addition to lab results from this visit, the labs listed above may include labs taken at another facility or during a different encounter within the last 24 hours. Please correlate lab times with ED admission and discharge times for further clarification of the services performed during this visit.    CT Abdomen Pelvis Without Contrast   Final Result   Impression:    1. An area of focal small bowel dilatation is seen in the lower abdomen containing  heterogeneous intraluminal material. This could reflect a small bowel diverticulum, including a possible Meckel's diverticulum.   2. Diverticulosis without acute diverticulitis.   3. Normal appendix.      Electronically signed by:  Tony Guerrero M.D.     5/29/2023 3:06 AM Mountain Time        Vitals:    05/29/23 0600 05/29/23 0615 05/29/23 0630 05/29/23 0645   BP: 92/66 100/65 105/60 99/61   Pulse: 74 73 82 76   Resp:       Temp:       SpO2: 93% 94% 98% 97%   Weight:       Height:         Medications   ondansetron (ZOFRAN) injection 4 mg (4 mg Intravenous Given 5/29/23 0413)   sodium chloride 0.9 % bolus 1,000 mL (0 mL Intravenous Stopped 5/29/23 0538)   Morphine sulfate (PF) injection 4 mg (4 mg Intravenous Given 5/29/23 0413)     ECG/EMG Results (last 24 hours)     Procedure Component Value Units Date/Time    ECG 12 Lead ED Triage Standing Order; Abdominal Pain (Upper) [[id number]] Collected: 05/29/23 0139     Updated: 05/29/23 0137     QT Interval 396 ms      QTC Interval 415 ms     Narrative:      Test Reason : ABD PAIN  Blood Pressure :   */*   mmHG  Vent. Rate :  66 BPM     Atrial Rate :  66 BPM     P-R Int : 142 ms          QRS Dur :  62 ms      QT Int : 396 ms       P-R-T Axes :  22  23  16 degrees     QTc Int : 415 ms    Normal sinus rhythm  Septal infarct , age undetermined  ST & T wave abnormality, consider anterior ischemia  Abnormal ECG  When compared with ECG of 24-MAY-2018 22:21,  Septal infarct is now present  ST now depressed in Anterior leads  T wave inversion now evident in Anterior leads  QT has shortened    Referred By:            Confirmed By:     ECG 12 Lead Chest Pain [[id number]] Collected: 05/29/23 0357     Updated: 05/29/23 0357     QT Interval 432 ms      QTC Interval 442 ms     Narrative:      Test Reason : Chest Pain  Blood Pressure :   */*   mmHG  Vent. Rate :  63 BPM     Atrial Rate :  63 BPM     P-R Int : 142 ms          QRS Dur :  76 ms      QT Int : 432 ms       P-R-T Axes :  15  12    3 degrees     QTc Int : 442 ms    Normal sinus rhythm  Nonspecific T wave abnormality  Abnormal ECG  When compared with ECG of 29-MAY-2023 01:39, (Unconfirmed)  No significant change was found    Referred By: EDMD           Confirmed By:         ECG 12 Lead Chest Pain   Preliminary Result   Test Reason : Chest Pain   Blood Pressure :   */*   mmHG   Vent. Rate :  63 BPM     Atrial Rate :  63 BPM      P-R Int : 142 ms          QRS Dur :  76 ms       QT Int : 432 ms       P-R-T Axes :  15  12   3 degrees      QTc Int : 442 ms      Normal sinus rhythm   Nonspecific T wave abnormality   Abnormal ECG   When compared with ECG of 29-MAY-2023 01:39, (Unconfirmed)   No significant change was found      Referred By: EDMD           Confirmed By:       ECG 12 Lead ED Triage Standing Order; Abdominal Pain (Upper)   Preliminary Result   Test Reason : ABD PAIN   Blood Pressure :   */*   mmHG   Vent. Rate :  66 BPM     Atrial Rate :  66 BPM      P-R Int : 142 ms          QRS Dur :  62 ms       QT Int : 396 ms       P-R-T Axes :  22  23  16 degrees      QTc Int : 415 ms      Normal sinus rhythm   Septal infarct , age undetermined   ST & T wave abnormality, consider anterior ischemia   Abnormal ECG   When compared with ECG of 24-MAY-2018 22:21,   Septal infarct is now present   ST now depressed in Anterior leads   T wave inversion now evident in Anterior leads   QT has shortened      Referred By:            Confirmed By:               Final diagnoses:   Epigastric abdominal pain   Nausea       ED Disposition  ED Disposition     ED Disposition   Discharge    Condition   Stable    Comment   --             Scout Navarro MD  [address] [id number]  [phone]    Schedule an appointment as soon as possible for a visit in 1 week           Medication List      New Prescriptions    famotidine 20 MG tablet  Commonly known as: PEPCID  Take 1 tablet by mouth 2 (Two) Times a Day.     HYDROcodone-acetaminophen  5-325 MG per tablet  Commonly known as: NORCO  Take 1 tablet by mouth Every 6 (Six) Hours As Needed for Severe Pain.     ondansetron ODT 4 MG disintegrating tablet  Commonly known as: ZOFRAN-ODT  Place 1 tablet on the tongue Every 6 (Six) Hours As Needed for Nausea or Vomiting. As needed for nausea           Where to Get Your Medications      These medications were sent to [facility] [address] [place] [address] [phone]  - [phone] [place], [place] [id number]    Phone: [phone]   · famotidine 20 MG tablet  · HYDROcodone-acetaminophen 5-325 MG per tablet  · ondansetron ODT 4 MG disintegrating tablet          Bina Sanches MD  05/31/23 6202

## 2023-06-02 LAB
QT INTERVAL: 396 MS
QT INTERVAL: 432 MS
QTC INTERVAL: 415 MS
QTC INTERVAL: 442 MS

## 2023-10-31 RX ORDER — SACUBITRIL AND VALSARTAN 24; 26 MG/1; MG/1
1 TABLET, FILM COATED ORAL EVERY 12 HOURS
Qty: 180 TABLET | Refills: 3 | Status: SHIPPED | OUTPATIENT
Start: 2023-10-31

## 2023-10-31 NOTE — TELEPHONE ENCOUNTER
Caller: Nicki Dillard    Relationship: Self    Best call back number: [phone]    Requested Prescriptions:   Requested Prescriptions     Pending Prescriptions Disp Refills    sacubitril-valsartan (Entresto) 24-26 MG tablet 180 tablet 3     Sig: Take 1 tablet by mouth Every 12 (Twelve) Hours.    metoprolol tartrate (LOPRESSOR) 25 MG tablet 90 tablet 3     Sig: Take 0.5 (one-half) tablet by mouth Every 12 (Twelve) Hours.        Pharmacy where request should be sent: [facility]ACY #[id number] [address] - [phone] [facility] [phone] FX     Last office visit with prescribing clinician: 9/12/2022   Last telemedicine visit with prescribing clinician: Visit date not found   Next office visit with prescribing clinician: 11/27/2023     Additional details provided by patient: 5 DAYS REMAINING     Does the patient have less than a 3 day supply:  [] Yes  [x] No    Would you like a call back once the refill request has been completed: [x] Yes [] No    If the office needs to give you a call back, can they leave a voicemail: [x] Yes [] No    João Muniz Rep   10/31/23 13:00 EDT

## 2023-11-27 ENCOUNTER — TELEPHONE (OUTPATIENT)
Dept: CARDIOLOGY | Facility: CLINIC | Age: 58
End: 2023-11-27

## 2023-11-27 NOTE — TELEPHONE ENCOUNTER
Caller: Nicki Dillard    Relationship: Self    Best call back number: [phone]     What was the call regarding: PT HAD FOLLOW APPT ON 11/27/23 AND DUE TO UNFORSEEN CIRCUMSTANCES, THEY HAD TO RESCHEDULE. NEXT AVAILABLE WAS  1/15/24. JUST WANTED TO MAKE OFFICE AWARE.

## 2025-01-27 NOTE — H&P
[facility]   [address] [place] [address], [address]  [place], [place], [id number] [phone]  [email or web address]           INPATIENT History and Physical      Patient Care Team:  Patient Care Team:  No Known Provider as PCP - General  No Known Provider as PCP - Family Medicine    Reason for consultation:   Chief Complaint   Patient presents with   • Chest Pain     Problem List:   1. H/O stress-induced cardiomyopathy.   a. Chest pain presentation with abnormal cardiac biomarkers to [facility] ED, 06/24/2014.   b. Cardiac catheterization  (06/26/2014): Normal coronary arteries with severe LV dysfunction in a stress-induced cardiomyopathy pattern.  2. Hypertension.   3. Surgical History:   a. Tonsillectomy.   b.  C-sections.   c. Laparoscopic incisional hernia repair.         4.     Obesity.        HPI:    Nicki Dillard is a 53 y.o. female.  History of Present Illness: Ms. Nicki Dillard is a 54 y/o female with the above medical history. She presented to ED via EMS today with complaints of chest pain which began at around 10:30. She reports getting upset at work at which time she reports getting lightheaded and sudden new onset substernal chest pain described as tightness. Pain radiates to mid upper back. She took one dose of NTG which provided some relief. While en route EMS gave her three doses of NTG and two ASA. She reports some improvement but this has not completely resolved. She reports having intermittent chest pain over the last few weeks which is worsened by activity and heat and is associated with shortness of breath. She was recently diagnosed with URI and is recovering with Augmentin and MucinexD. Initial troponin 0.14. Repeat troponin 2.85. CXR without an acute process. Cardiology has been consulted for further evaluation. Of note, she is a former patient of Dr. Castrejon. Last seen in 2014. She has h/o stress-induced cardiomyopathy which manifested as chest pain. She had a  cardiac heart cath on 6/2014 with normal coronary arteries with severely reduced LVEF.     PFSH:  Patient Active Problem List   Diagnosis   • Menorrhagia   Hypertension  Arthritis      No current facility-administered medications on file prior to encounter.      Current Outpatient Prescriptions on File Prior to Encounter   Medication Sig Dispense Refill   • amoxicillin-clavulanate (AUGMENTIN) 875-125 MG per tablet Take 1 tablet by mouth 2 (Two) Times a Day. 20 tablet 0   • Multiple Vitamin (MULTI-VITAMIN DAILY PO) Take  by mouth.     • nitroglycerin (NITROSTAT) 0.4 MG SL tablet Place 1 tablet under the tongue Every 5 (Five) Minutes As Needed for Chest Pain. Take no more than 3 doses in 15 minutes. 100 tablet 0     No Known Allergies    Social History     Social History   • Marital status:      Social History Main Topics   • Smoking status: Never Smoker   • Smokeless tobacco: Never Used   • Alcohol use Yes      Comment: occ   • Drug use: No   • Sexual activity: Yes     Partners: Male     Other Topics Concern   • Not on file     Family History   Problem Relation Age of Onset   • Hypertension Father        Review of Systems:  Positive for chest pain, LH, Headache. See HPI.  All other systems reviewed are negative.         Objective:     Vital Sign Min/Max for last 24 hours  Temp  Min: 98 °F (36.7 °C)  Max: 98 °F (36.7 °C)   BP  Min: 143/96  Max: 157/111   Pulse  Min: 79  Max: 94   Resp  Min: 14  Max: 14   SpO2  Min: 96 %  Max: 100 %   No Data Recorded    No intake or output data in the 24 hours ending 05/24/18 1555        Vitals:    05/24/18 1430   BP: 143/96   Pulse:    Resp:    Temp:    SpO2:        CONSTITUTIONAL: Well-nourished. In no acute distress.   SKIN: Warm and dry. No rashes noted  HEENT: Head is normocephalic and atraumatic. Mucous membranes are pink and moist.   NECK: Supple without masses or thyromegaly. There is no jugular venous distention at 30°.  LUNGS: Normal effort. Clear to auscultation  bilaterally without wheezing, rhonchi, or rales noted.   CARDIOVASCULAR: The heart has a regular rate with a normal S1 and S2. There is no murmur, gallop, rub, or click appreciated.   PERIPHERAL VASCULAR: Carotid upstroke is 2+ bilaterally and without bruits. Radial pulses are 2+ bilaterally. Posterior tibial pulses are 2+ and symmetrical. There is no lower extremity edema.   ABDOMEN: Soft with no tenderness with palpitation. No hepatosplenomegaly  MUSCULOSKELETAL:  No digital cyanosis  NEUROLOGICAL: Nonfocal.  PSYCHIATRIC: Alert, orientated x 3, appropriate affect     Labs:  Lab Results   Component Value Date    TRIG 87 06/27/2014    HDL 48 06/27/2014       Diagnostic Data:    EKG:  NSR, 89 bpm, no ST seg changes  Tele:  NSR 82 bpm         Assessment and Plan:   Assessment:  1) Chest pain/Elevated troponin  - Patient with sudden onset of chest pain a/w lightheadedness and brought on with stress, radiation to back, relieved partially with NTG.   - Recent exertional chest tightness/dyspnea over last month  - Similar presentation in 2014; 2014 with normal coronaries; EF 25% with findings consistent with stress-induced cardiomyopathy  - Initial troponin 0.14. Repeat troponin 2.85.   - EKG without significant ST segment changes  - CXR without acute process  - BNP 66  - Echocardiogram obtained- pending read    2) Hypertension  - Hypertensive on arrival. Given labetalol 10 mg IV x 1. Now normotensive.    3) URI  -Will complete coarse of Augmentin    Plan:  -NPO at midnight  -[facility] in AM; will do Site Rite on left radial artery before procedure, if suitable will proceed through left radial artery access. Would prep right femoral also if needed  -Full dose Lovenox 1mg/kg x 1 dose tonight  -Lopressor 12.5 mg BID, Entresto 24-26mg BID  -ASA 81 mg    Scribed for Serge Valenzuela MD by KEESHA Veras, APRN. 5/24/2018  4:13 PM    ISerge MD, personally performed the services as scribed by the above named individual.  I have made any necessary edits and it is both accurate and complete.     Serge Valenzuela MD, MSc, [facility]  Interventional Cardiology  [facility]         no

## 2025-04-02 ENCOUNTER — TRANSCRIBE ORDERS (OUTPATIENT)
Dept: ADMINISTRATIVE | Facility: HOSPITAL | Age: 60
End: 2025-04-02
Payer: COMMERCIAL

## 2025-04-02 DIAGNOSIS — Z12.31 SCREENING MAMMOGRAM FOR BREAST CANCER: Primary | ICD-10-CM

## 2025-05-01 ENCOUNTER — HOSPITAL ENCOUNTER (OUTPATIENT)
Dept: MAMMOGRAPHY | Facility: HOSPITAL | Age: 60
Discharge: HOME | End: 2025-05-01
Admitting: OBSTETRICS & GYNECOLOGY
Payer: COMMERCIAL

## 2025-05-01 DIAGNOSIS — Z12.31 SCREENING MAMMOGRAM FOR BREAST CANCER: ICD-10-CM

## 2025-05-01 LAB
NCCN CRITERIA FLAG: NORMAL
TYRER CUZICK SCORE: 6.3

## 2025-05-01 PROCEDURE — 77067 SCR MAMMO BI INCL CAD: CPT

## 2025-05-01 PROCEDURE — 77063 BREAST TOMOSYNTHESIS BI: CPT

## 2025-05-15 ENCOUNTER — OFFICE VISIT (OUTPATIENT)
Dept: OBSTETRICS AND GYNECOLOGY | Facility: CLINIC | Age: 60
End: 2025-05-15
Payer: COMMERCIAL

## 2025-05-15 VITALS
HEIGHT: 60 IN | SYSTOLIC BLOOD PRESSURE: 130 MMHG | DIASTOLIC BLOOD PRESSURE: 86 MMHG | BODY MASS INDEX: 33.96 KG/M2 | WEIGHT: 173 LBS

## 2025-05-15 DIAGNOSIS — Z01.419 ENCOUNTER FOR WELL WOMAN EXAM WITH ROUTINE GYNECOLOGICAL EXAM: Primary | ICD-10-CM

## 2025-05-15 DIAGNOSIS — N39.46 URINARY INCONTINENCE, MIXED: ICD-10-CM

## 2025-05-15 RX ORDER — VALACYCLOVIR HYDROCHLORIDE 1 G/1
1000 TABLET, FILM COATED ORAL 2 TIMES DAILY
COMMUNITY

## 2025-05-15 NOTE — PROGRESS NOTES
"Subjective   Chief Complaint   Patient presents with    Annual Exam     No c/c     Nicki Dillard is a 60 y.o. year old  menopausal female presenting to be seen for her annual exam.  This past year she has not been on hormone replacement therapy.  There has not been vaginal bleeding in the last 12 months.  Menopausal symptoms are not present.    SEXUAL Hx:  She is currently sexually active.  In the past year there there has been NO new sexual partners.    Condoms are never used.  She would not like to be screened for STD's at today's exam.  [place] is painful: no  HEALTH Hx:  She exercises regularly: no (but is planning to start exercising more).  She wears her seat belt: yes.  She has concerns about domestic violence: no.  OTHER THINGS SHE WANTS TO DISCUSS TODAY:  Nothing else    The following portions of the patient's history were reviewed and updated as appropriate:problem list, current medications, allergies, past family history, past medical history, past social history, and past surgical history.    Social History    Tobacco Use      Smoking status: Never        Passive exposure: Never      Smokeless tobacco: Never      Review of Systems  Constitutional POS: nothing reported    NEG: anorexia or night sweats   Genitourinary POS: urge incontinene is present but it IS NOT effecting her ADL's    NEG: dysuria or hematuria      Gastointestinal POS: nothing reported    NEG: bloating, change in bowel habits, melena, or reflux symptoms   Integument POS: nothing reported    NEG: moles that are changing in size, shape, color or rashes   Breast POS: nothing reported    NEG: persistent breast lump, skin dimpling, or nipple discharge        Objective   /86 (BP Location: Right arm, Patient Position: Sitting, Cuff Size: Adult)   Ht 152.4 cm (60\")   Wt 78.5 kg (173 lb)   LMP 2017   BMI 33.79 kg/m²     General:  well developed; well nourished  no acute distress  mentation appropriate   Skin: "  No suspicious lesions seen   Thyroid: normal to inspection and palpation   Breasts:  Examined in supine position  Symmetric without masses or skin dimpling  Nipples normal without inversion, lesions or discharge  There are no palpable axillary nodes   Abdomen: soft, non-tender; no masses  no umbilical or inguinal hernias are present  no hepato-splenomegaly   Pelvis: Clinical staff was present for exam  External genitalia:  normal appearance of the external genitalia including Bartholin's and Washta's glands.  :  urethral meatus normal; urethra normal:  Vaginal:  atrophic mucosal changes are present;  Cervix:  normal appearance. atrophic  Uterus:  normal size, shape and consistency.  Adnexa:  normal bimanual exam of the adnexa.  Rectal:  digital rectal exam not performed; anus visually normal appearing.        Assessment   Normal GYN exam in menopause  Mixed UI not affecting ADLs  She is up to date on all relevant gynecologic and colorectal screenings except PAP test     Plan   Pap was done today.  If she does not receive the results of the Pap within 2 weeks  time, she was instructed to call to find out the results.  I explained to Nicki that the recommendations for Pap smear interval in a low risk patient's has lengthened to 3 years time.  I encouraged her to be seen yearly for a full physical exam including breast and pelvic exam even during the off years when PAP's will not be performed.  Reviewed the importance of exercise 2-3 times per week with at least 20-30 minutes of cardio and incorporation of weight training.   She was encouraged to get yearly mammograms.  She should report any palpable breast lump(s) or skin changes regardless of mammographic findings.  I explained to Nicki that notification regarding her mammogram results will come from the center performing the study.  Our office will not be routinely calling with mammogram results.  It is her responsibility to make sure that the results from  the mammogram are communicated to her by the breast center.  If she has any questions about the results, she is welcome to call our office anytime.  Colon cancer due end of next year.   Recommend Kegel exercises and continued weight loss for #2  The importance of keeping all planned follow-up and taking all medications as prescribed was emphasized.  Follow up for annual exam in ~ one year    No orders of the defined types were placed in this encounter.         This note was electronically signed.    Bernadette Padilla MD  May 15, 2025

## 2025-05-19 LAB — REF LAB TEST METHOD: NORMAL

## 2025-08-07 ENCOUNTER — TELEPHONE (OUTPATIENT)
Dept: CARDIOLOGY | Facility: CLINIC | Age: 60
End: 2025-08-07
Payer: COMMERCIAL

## 2025-08-08 ENCOUNTER — OFFICE VISIT (OUTPATIENT)
Dept: INTERNAL MEDICINE | Facility: CLINIC | Age: 60
End: 2025-08-08
Payer: COMMERCIAL

## 2025-08-08 VITALS
HEART RATE: 72 BPM | BODY MASS INDEX: 33.18 KG/M2 | RESPIRATION RATE: 18 BRPM | DIASTOLIC BLOOD PRESSURE: 82 MMHG | HEIGHT: 60 IN | WEIGHT: 169 LBS | TEMPERATURE: 98.4 F | SYSTOLIC BLOOD PRESSURE: 130 MMHG

## 2025-08-08 DIAGNOSIS — Z13.220 LIPID SCREENING: ICD-10-CM

## 2025-08-08 DIAGNOSIS — I10 PRIMARY HYPERTENSION: Primary | ICD-10-CM

## 2025-08-08 DIAGNOSIS — I51.81 TAKOTSUBO CARDIOMYOPATHY: ICD-10-CM

## 2025-08-08 DIAGNOSIS — Z13.1 ENCOUNTER FOR SCREENING EXAMINATION FOR IMPAIRED GLUCOSE REGULATION AND DIABETES MELLITUS: ICD-10-CM

## 2025-08-08 DIAGNOSIS — Z13.29 THYROID DISORDER SCREEN: ICD-10-CM

## 2025-08-08 DIAGNOSIS — Z23 IMMUNIZATION DUE: ICD-10-CM

## 2025-08-08 LAB
ALBUMIN SERPL-MCNC: 4.3 G/DL (ref 3.5–5.2)
ALBUMIN/GLOB SERPL: 1.4 G/DL
ALP SERPL-CCNC: 74 U/L (ref 39–117)
ALT SERPL W P-5'-P-CCNC: 11 U/L (ref 1–33)
ANION GAP SERPL CALCULATED.3IONS-SCNC: 10.4 MMOL/L (ref 5–15)
AST SERPL-CCNC: 17 U/L (ref 1–32)
BILIRUB SERPL-MCNC: 0.2 MG/DL (ref 0–1.2)
BUN SERPL-MCNC: 8 MG/DL (ref 6–20)
BUN/CREAT SERPL: 11.8 (ref 7–25)
CALCIUM SPEC-SCNC: 9.6 MG/DL (ref 8.6–10.5)
CHLORIDE SERPL-SCNC: 102 MMOL/L (ref 98–107)
CHOLEST SERPL-MCNC: 180 MG/DL (ref 0–200)
CO2 SERPL-SCNC: 25.6 MMOL/L (ref 22–29)
CREAT SERPL-MCNC: 0.68 MG/DL (ref 0.57–1)
EGFRCR SERPLBLD CKD-EPI 2021: 100.5 ML/MIN/1.73
GLOBULIN UR ELPH-MCNC: 3 GM/DL
GLUCOSE SERPL-MCNC: 79 MG/DL (ref 65–99)
HDLC SERPL-MCNC: 62 MG/DL (ref 40–60)
LDLC SERPL CALC-MCNC: 107 MG/DL (ref 0–100)
LDLC/HDLC SERPL: 1.73 {RATIO}
POTASSIUM SERPL-SCNC: 4.2 MMOL/L (ref 3.5–5.2)
PROT SERPL-MCNC: 7.3 G/DL (ref 6–8.5)
SODIUM SERPL-SCNC: 138 MMOL/L (ref 136–145)
TRIGL SERPL-MCNC: 55 MG/DL (ref 0–150)
TSH SERPL DL<=0.05 MIU/L-ACNC: 2.44 UIU/ML (ref 0.27–4.2)
VLDLC SERPL-MCNC: 11 MG/DL (ref 5–40)

## 2025-08-08 PROCEDURE — 80053 COMPREHEN METABOLIC PANEL: CPT | Performed by: NURSE PRACTITIONER

## 2025-08-08 PROCEDURE — 80061 LIPID PANEL: CPT | Performed by: NURSE PRACTITIONER

## 2025-08-08 PROCEDURE — 84443 ASSAY THYROID STIM HORMONE: CPT | Performed by: NURSE PRACTITIONER

## 2025-08-08 PROCEDURE — 85025 COMPLETE CBC W/AUTO DIFF WBC: CPT | Performed by: NURSE PRACTITIONER

## 2025-08-08 RX ORDER — NITROGLYCERIN 0.4 MG/1
0.4 TABLET SUBLINGUAL
Qty: 25 TABLET | Refills: 0 | Status: SHIPPED | OUTPATIENT
Start: 2025-08-08

## 2025-08-08 RX ORDER — SACUBITRIL AND VALSARTAN 24; 26 MG/1; MG/1
1 TABLET, FILM COATED ORAL EVERY 12 HOURS
Qty: 180 TABLET | Refills: 1 | Status: SHIPPED | OUTPATIENT
Start: 2025-08-08

## 2025-08-08 RX ORDER — METOPROLOL TARTRATE 25 MG/1
12.5 TABLET, FILM COATED ORAL EVERY 12 HOURS SCHEDULED
Qty: 90 TABLET | Refills: 1 | Status: SHIPPED | OUTPATIENT
Start: 2025-08-08

## 2025-08-09 LAB
BASOPHILS # BLD AUTO: 0.04 10*3/MM3 (ref 0–0.2)
BASOPHILS NFR BLD AUTO: 0.7 % (ref 0–1.5)
DEPRECATED RDW RBC AUTO: 37.5 FL (ref 37–54)
EOSINOPHIL # BLD AUTO: 0.03 10*3/MM3 (ref 0–0.4)
EOSINOPHIL NFR BLD AUTO: 0.5 % (ref 0.3–6.2)
ERYTHROCYTE [DISTWIDTH] IN BLOOD BY AUTOMATED COUNT: 11.8 % (ref 12.3–15.4)
HCT VFR BLD AUTO: 40.2 % (ref 34–46.6)
HGB BLD-MCNC: 13.3 G/DL (ref 12–15.9)
IMM GRANULOCYTES # BLD AUTO: 0.01 10*3/MM3 (ref 0–0.05)
IMM GRANULOCYTES NFR BLD AUTO: 0.2 % (ref 0–0.5)
LYMPHOCYTES # BLD AUTO: 2.08 10*3/MM3 (ref 0.7–3.1)
LYMPHOCYTES NFR BLD AUTO: 35.6 % (ref 19.6–45.3)
MCH RBC QN AUTO: 29.2 PG (ref 26.6–33)
MCHC RBC AUTO-ENTMCNC: 33.1 G/DL (ref 31.5–35.7)
MCV RBC AUTO: 88.2 FL (ref 79–97)
MONOCYTES # BLD AUTO: 0.44 10*3/MM3 (ref 0.1–0.9)
MONOCYTES NFR BLD AUTO: 7.5 % (ref 5–12)
NEUTROPHILS NFR BLD AUTO: 3.25 10*3/MM3 (ref 1.7–7)
NEUTROPHILS NFR BLD AUTO: 55.5 % (ref 42.7–76)
NRBC BLD AUTO-RTO: 0 /100 WBC (ref 0–0.2)
PLATELET # BLD AUTO: 342 10*3/MM3 (ref 140–450)
PMV BLD AUTO: 9.5 FL (ref 6–12)
RBC # BLD AUTO: 4.56 10*6/MM3 (ref 3.77–5.28)
WBC NRBC COR # BLD AUTO: 5.85 10*3/MM3 (ref 3.4–10.8)

## 2025-08-10 ENCOUNTER — RESULTS FOLLOW-UP (OUTPATIENT)
Dept: INTERNAL MEDICINE | Facility: CLINIC | Age: 60
End: 2025-08-10
Payer: COMMERCIAL

## 2025-08-12 ENCOUNTER — TELEPHONE (OUTPATIENT)
Dept: INTERNAL MEDICINE | Facility: CLINIC | Age: 60
End: 2025-08-12
Payer: COMMERCIAL

## 2025-08-12 DIAGNOSIS — I51.81 TAKOTSUBO CARDIOMYOPATHY: Primary | ICD-10-CM

## 2025-08-14 ENCOUNTER — TELEPHONE (OUTPATIENT)
Dept: INTERNAL MEDICINE | Facility: CLINIC | Age: 60
End: 2025-08-14
Payer: COMMERCIAL

## 2025-08-15 RX ORDER — SACUBITRIL AND VALSARTAN 24; 26 MG/1; MG/1
1 TABLET, FILM COATED ORAL EVERY 12 HOURS
Qty: 60 TABLET | Refills: 0 | Status: SHIPPED | OUTPATIENT
Start: 2025-08-15 | End: 2025-08-15 | Stop reason: SDUPTHER

## 2025-08-15 RX ORDER — SACUBITRIL AND VALSARTAN 24; 26 MG/1; MG/1
1 TABLET, FILM COATED ORAL EVERY 12 HOURS
Qty: 60 TABLET | Refills: 0 | Status: SHIPPED | OUTPATIENT
Start: 2025-08-15

## 2025-08-18 ENCOUNTER — TELEPHONE (OUTPATIENT)
Dept: INTERNAL MEDICINE | Facility: CLINIC | Age: 60
End: 2025-08-18
Payer: COMMERCIAL

## (undated) DEVICE — GW J TP FIX CORE .035 150

## (undated) DEVICE — ST EXT IV SMARTSITE 2VLV SP M LL 5ML IV1

## (undated) DEVICE — CATH DIAG EXPO .045 FL3.5 5F 100CM

## (undated) DEVICE — MODEL AT P65, P/N 701554-001KIT CONTENTS: HAND CONTROLLER, 3-WAY HIGH-PRESSURE STOPCOCK WITH ROTATING END AND PREMIUM HIGH-PRESSURE TUBING: Brand: ANGIOTOUCH® KIT

## (undated) DEVICE — INTRAOPERATIVE COVER KIT, 10 PACK: Brand: SITE-RITE

## (undated) DEVICE — PINNACLE INTRODUCER SHEATH: Brand: PINNACLE

## (undated) DEVICE — INTRO SHEATH PRELUDE IDEAL SPRNG COIL 021 6F 23X80CM

## (undated) DEVICE — CATH DIAG EXPO M/ PK 5F FL4/FR4 PIG

## (undated) DEVICE — ANGIO-SEAL EVOLUTION VASCULAR CLOSURE DEVICE: Brand: ANGIO-SEAL

## (undated) DEVICE — GW FC FLOP/TP .035 260CM 3MM

## (undated) DEVICE — ST INF PRI SMRTSTE 20DRP 2VLV 24ML 117

## (undated) DEVICE — PK CATH CARD 10

## (undated) DEVICE — MODEL BT2000 P/N 700287-012KIT CONTENTS: MANIFOLD WITH SALINE AND CONTRAST PORTS, SALINE TUBING WITH SPIKE AND HAND SYRINGE, TRANSDUCER: Brand: BT2000 AUTOMATED MANIFOLD KIT